# Patient Record
Sex: FEMALE | Race: BLACK OR AFRICAN AMERICAN | NOT HISPANIC OR LATINO
[De-identification: names, ages, dates, MRNs, and addresses within clinical notes are randomized per-mention and may not be internally consistent; named-entity substitution may affect disease eponyms.]

---

## 2017-05-30 PROBLEM — Z00.00 ENCOUNTER FOR PREVENTIVE HEALTH EXAMINATION: Status: ACTIVE | Noted: 2017-05-30

## 2019-08-18 ENCOUNTER — TRANSCRIPTION ENCOUNTER (OUTPATIENT)
Age: 29
End: 2019-08-18

## 2019-11-13 ENCOUNTER — INPATIENT (INPATIENT)
Facility: HOSPITAL | Age: 29
LOS: 1 days | Discharge: HOME | End: 2019-11-15
Attending: SURGERY | Admitting: SURGERY
Payer: MEDICARE

## 2019-11-13 VITALS
SYSTOLIC BLOOD PRESSURE: 138 MMHG | HEART RATE: 76 BPM | HEIGHT: 66 IN | TEMPERATURE: 98 F | WEIGHT: 188.94 LBS | OXYGEN SATURATION: 100 % | DIASTOLIC BLOOD PRESSURE: 97 MMHG | RESPIRATION RATE: 16 BRPM

## 2019-11-13 DIAGNOSIS — K81.0 ACUTE CHOLECYSTITIS: ICD-10-CM

## 2019-11-13 LAB
ALBUMIN SERPL ELPH-MCNC: 3.7 G/DL — SIGNIFICANT CHANGE UP (ref 3.3–5)
ALP SERPL-CCNC: 138 U/L — HIGH (ref 40–120)
ALT FLD-CCNC: 631 U/L — HIGH (ref 12–78)
ANION GAP SERPL CALC-SCNC: 6 MMOL/L — SIGNIFICANT CHANGE UP (ref 5–17)
APPEARANCE UR: CLEAR — SIGNIFICANT CHANGE UP
AST SERPL-CCNC: 621 U/L — HIGH (ref 15–37)
BACTERIA # UR AUTO: ABNORMAL
BASOPHILS # BLD AUTO: 0.01 K/UL — SIGNIFICANT CHANGE UP (ref 0–0.2)
BASOPHILS NFR BLD AUTO: 0.1 % — SIGNIFICANT CHANGE UP (ref 0–2)
BILIRUB SERPL-MCNC: 0.9 MG/DL — SIGNIFICANT CHANGE UP (ref 0.2–1.2)
BILIRUB UR-MCNC: NEGATIVE — SIGNIFICANT CHANGE UP
BLD GP AB SCN SERPL QL: SIGNIFICANT CHANGE UP
BUN SERPL-MCNC: 10 MG/DL — SIGNIFICANT CHANGE UP (ref 7–23)
CALCIUM SERPL-MCNC: 8.9 MG/DL — SIGNIFICANT CHANGE UP (ref 8.5–10.1)
CHLORIDE SERPL-SCNC: 106 MMOL/L — SIGNIFICANT CHANGE UP (ref 96–108)
CO2 SERPL-SCNC: 27 MMOL/L — SIGNIFICANT CHANGE UP (ref 22–31)
COLOR SPEC: YELLOW — SIGNIFICANT CHANGE UP
CREAT SERPL-MCNC: 0.96 MG/DL — SIGNIFICANT CHANGE UP (ref 0.5–1.3)
DIFF PNL FLD: NEGATIVE — SIGNIFICANT CHANGE UP
EOSINOPHIL # BLD AUTO: 0.17 K/UL — SIGNIFICANT CHANGE UP (ref 0–0.5)
EOSINOPHIL NFR BLD AUTO: 2.5 % — SIGNIFICANT CHANGE UP (ref 0–6)
EPI CELLS # UR: ABNORMAL
GLUCOSE SERPL-MCNC: 75 MG/DL — SIGNIFICANT CHANGE UP (ref 70–99)
GLUCOSE UR QL: NEGATIVE MG/DL — SIGNIFICANT CHANGE UP
HCG SERPL-ACNC: <1 MIU/ML — SIGNIFICANT CHANGE UP
HCT VFR BLD CALC: 36.9 % — SIGNIFICANT CHANGE UP (ref 34.5–45)
HGB BLD-MCNC: 11.8 G/DL — SIGNIFICANT CHANGE UP (ref 11.5–15.5)
IMM GRANULOCYTES NFR BLD AUTO: 0.3 % — SIGNIFICANT CHANGE UP (ref 0–1.5)
KETONES UR-MCNC: NEGATIVE — SIGNIFICANT CHANGE UP
LEUKOCYTE ESTERASE UR-ACNC: ABNORMAL
LIDOCAIN IGE QN: 3872 U/L — HIGH (ref 73–393)
LYMPHOCYTES # BLD AUTO: 2.1 K/UL — SIGNIFICANT CHANGE UP (ref 1–3.3)
LYMPHOCYTES # BLD AUTO: 31.3 % — SIGNIFICANT CHANGE UP (ref 13–44)
MCHC RBC-ENTMCNC: 28.6 PG — SIGNIFICANT CHANGE UP (ref 27–34)
MCHC RBC-ENTMCNC: 32 GM/DL — SIGNIFICANT CHANGE UP (ref 32–36)
MCV RBC AUTO: 89.6 FL — SIGNIFICANT CHANGE UP (ref 80–100)
MONOCYTES # BLD AUTO: 0.47 K/UL — SIGNIFICANT CHANGE UP (ref 0–0.9)
MONOCYTES NFR BLD AUTO: 7 % — SIGNIFICANT CHANGE UP (ref 2–14)
NEUTROPHILS # BLD AUTO: 3.93 K/UL — SIGNIFICANT CHANGE UP (ref 1.8–7.4)
NEUTROPHILS NFR BLD AUTO: 58.8 % — SIGNIFICANT CHANGE UP (ref 43–77)
NITRITE UR-MCNC: NEGATIVE — SIGNIFICANT CHANGE UP
NRBC # BLD: 0 /100 WBCS — SIGNIFICANT CHANGE UP (ref 0–0)
PH UR: 8 — SIGNIFICANT CHANGE UP (ref 5–8)
PLATELET # BLD AUTO: 224 K/UL — SIGNIFICANT CHANGE UP (ref 150–400)
POTASSIUM SERPL-MCNC: 4.2 MMOL/L — SIGNIFICANT CHANGE UP (ref 3.5–5.3)
POTASSIUM SERPL-SCNC: 4.2 MMOL/L — SIGNIFICANT CHANGE UP (ref 3.5–5.3)
PROT SERPL-MCNC: 7.6 GM/DL — SIGNIFICANT CHANGE UP (ref 6–8.3)
PROT UR-MCNC: NEGATIVE MG/DL — SIGNIFICANT CHANGE UP
RBC # BLD: 4.12 M/UL — SIGNIFICANT CHANGE UP (ref 3.8–5.2)
RBC # FLD: 13.5 % — SIGNIFICANT CHANGE UP (ref 10.3–14.5)
SODIUM SERPL-SCNC: 139 MMOL/L — SIGNIFICANT CHANGE UP (ref 135–145)
SP GR SPEC: 1.01 — SIGNIFICANT CHANGE UP (ref 1.01–1.02)
UROBILINOGEN FLD QL: NEGATIVE MG/DL — SIGNIFICANT CHANGE UP
WBC # BLD: 6.7 K/UL — SIGNIFICANT CHANGE UP (ref 3.8–10.5)
WBC # FLD AUTO: 6.7 K/UL — SIGNIFICANT CHANGE UP (ref 3.8–10.5)
WBC UR QL: SIGNIFICANT CHANGE UP

## 2019-11-13 PROCEDURE — 74177 CT ABD & PELVIS W/CONTRAST: CPT | Mod: 26

## 2019-11-13 PROCEDURE — 76700 US EXAM ABDOM COMPLETE: CPT | Mod: 26

## 2019-11-13 PROCEDURE — 99285 EMERGENCY DEPT VISIT HI MDM: CPT

## 2019-11-13 RX ORDER — PIPERACILLIN AND TAZOBACTAM 4; .5 G/20ML; G/20ML
3.38 INJECTION, POWDER, LYOPHILIZED, FOR SOLUTION INTRAVENOUS ONCE
Refills: 0 | Status: COMPLETED | OUTPATIENT
Start: 2019-11-13 | End: 2019-11-13

## 2019-11-13 RX ORDER — IOHEXOL 300 MG/ML
30 INJECTION, SOLUTION INTRAVENOUS ONCE
Refills: 0 | Status: COMPLETED | OUTPATIENT
Start: 2019-11-13 | End: 2019-11-13

## 2019-11-13 RX ORDER — SODIUM CHLORIDE 9 MG/ML
1000 INJECTION INTRAMUSCULAR; INTRAVENOUS; SUBCUTANEOUS ONCE
Refills: 0 | Status: COMPLETED | OUTPATIENT
Start: 2019-11-13 | End: 2019-11-13

## 2019-11-13 RX ORDER — HEPARIN SODIUM 5000 [USP'U]/ML
5000 INJECTION INTRAVENOUS; SUBCUTANEOUS EVERY 8 HOURS
Refills: 0 | Status: DISCONTINUED | OUTPATIENT
Start: 2019-11-13 | End: 2019-11-15

## 2019-11-13 RX ORDER — ONDANSETRON 8 MG/1
4 TABLET, FILM COATED ORAL ONCE
Refills: 0 | Status: COMPLETED | OUTPATIENT
Start: 2019-11-13 | End: 2019-11-13

## 2019-11-13 RX ORDER — SODIUM CHLORIDE 9 MG/ML
1000 INJECTION, SOLUTION INTRAVENOUS
Refills: 0 | Status: DISCONTINUED | OUTPATIENT
Start: 2019-11-13 | End: 2019-11-15

## 2019-11-13 RX ORDER — FERROUS SULFATE 325(65) MG
65 TABLET ORAL
Qty: 0 | Refills: 0 | DISCHARGE

## 2019-11-13 RX ORDER — MORPHINE SULFATE 50 MG/1
2 CAPSULE, EXTENDED RELEASE ORAL EVERY 4 HOURS
Refills: 0 | Status: DISCONTINUED | OUTPATIENT
Start: 2019-11-13 | End: 2019-11-15

## 2019-11-13 RX ORDER — ACETAMINOPHEN 500 MG
975 TABLET ORAL ONCE
Refills: 0 | Status: COMPLETED | OUTPATIENT
Start: 2019-11-13 | End: 2019-11-13

## 2019-11-13 RX ADMIN — ONDANSETRON 4 MILLIGRAM(S): 8 TABLET, FILM COATED ORAL at 15:44

## 2019-11-13 RX ADMIN — IOHEXOL 30 MILLILITER(S): 300 INJECTION, SOLUTION INTRAVENOUS at 15:49

## 2019-11-13 RX ADMIN — HEPARIN SODIUM 5000 UNIT(S): 5000 INJECTION INTRAVENOUS; SUBCUTANEOUS at 23:10

## 2019-11-13 RX ADMIN — SODIUM CHLORIDE 125 MILLILITER(S): 9 INJECTION, SOLUTION INTRAVENOUS at 23:10

## 2019-11-13 RX ADMIN — PIPERACILLIN AND TAZOBACTAM 200 GRAM(S): 4; .5 INJECTION, POWDER, LYOPHILIZED, FOR SOLUTION INTRAVENOUS at 18:26

## 2019-11-13 RX ADMIN — Medication 975 MILLIGRAM(S): at 15:44

## 2019-11-13 RX ADMIN — SODIUM CHLORIDE 1000 MILLILITER(S): 9 INJECTION INTRAMUSCULAR; INTRAVENOUS; SUBCUTANEOUS at 15:44

## 2019-11-13 RX ADMIN — SODIUM CHLORIDE 1000 MILLILITER(S): 9 INJECTION INTRAMUSCULAR; INTRAVENOUS; SUBCUTANEOUS at 17:30

## 2019-11-13 NOTE — H&P ADULT - NSHPLABSRESULTS_GEN_ALL_CORE
CBC             11.8   6.70  )-----------( 224      ( 2019 15:41 )             36.9   CMP    139  |  106  |  10  ----------------------------<  75  4.2   |  27  |  0.96    Ca    8.9      2019 15:41    TPro  7.6  /  Alb  3.7  /  TBili  0.9  /  DBili  x   /  AST  621<H>  /  ALT  631<H>  /  AlkPhos  138<H>      Urinalysis Basic - ( 2019 18:00 )    Color: Yellow / Appearance: Clear / S.010 / pH: x  Gluc: x / Ketone: Negative  / Bili: Negative / Urobili: Negative mg/dL   Blood: x / Protein: Negative mg/dL / Nitrite: Negative   Leuk Esterase: Small / RBC: x / WBC 3-5   Sq Epi: x / Non Sq Epi: Moderate / Bacteria: Few    US Abdomen Complete (19 @ 16:25)  IMPRESSION:   1. Borderline liver size. No hepatic pathology otherwise.  2. Definite gallbladder disease with small calcific stones, wall   thickening and positive sonographic Zepeda's sign.     CT Abdomen and Pelvis w/ IV Cont (19 @ 18:11)  Impression: No CT evidence for acute pancreatitis. Clinical correlation   with pancreatic enzymes is recommended.    Cholelithiasis with pericholecystic stranding, suggestive of acute   cholecystitis. If clinically indicated, HIDA scan may be pursued for   further evaluation.

## 2019-11-13 NOTE — ED PROVIDER NOTE - CLINICAL SUMMARY MEDICAL DECISION MAKING FREE TEXT BOX
Ddx: Biliary colic/ cholecystitis/ gastritis/ no lower abd pain to suggest appendicitis or ovarian torsion  Plan: Cbc, cmp, lipase, ua, hcg, pt only wants tylenol for pain/ US, reassess

## 2019-11-13 NOTE — H&P ADULT - HISTORY OF PRESENT ILLNESS
Pt is a 30 y/o female with PMHx of gallstones c/o abdominal pain. Pt states that the pain began 2 days ago around 3 AM. She states that the pain started as a tightness in her chest that radiated to her back. She states that the following morning she felt "completely fine" and that her abdominal pain was a 1 out of 10 in severity. Today patient states she ate pancakes and eggs for breakfast, which triggered her abdominal pain and prompted her to come to the ED. Patient states that her pain was a 9 out of 10 in severity and diffuse throughout her abdomen. She admits that her pain is most severe in her epigastric and periumbilical area. She also admits to nausea and an episode of "shakes". She states that she has never experienced anything like this before, but admits that she has a h/o gallstones. Denies fever, vomiting, diarrhea, SOB, chest pain, dizziness, dysuria, hematouria, change in bowel habits.

## 2019-11-13 NOTE — H&P ADULT - PROBLEM SELECTOR PLAN 1
- Serial abdominal exams  - MRCP in AM to r/o CBD stone  - IVFs/NPO  - Pain management  - VTE prophylaxis   - Will discuss with Dr. Nunez

## 2019-11-13 NOTE — ED PROVIDER NOTE - OBJECTIVE STATEMENT
Pt is a 30 yo lady with a pmhx of gallstones who presents to the ED with abdominal pain. It started 2 nights ago. Is on the RUQ, goes to the back. Has had nausea. Is worse after eating. No dysuria, no fevers. LMP 10/30. No hx of abd surgeries.

## 2019-11-13 NOTE — H&P ADULT - NSHPPHYSICALEXAM_GEN_ALL_CORE
Constitutional: WDWN resting comfortably in bed; NAD  HEENT: NC/AT, PERRL, EOMI, anicteric sclera, no nasal discharge; uvula midline, no oropharyngeal erythema or exudates  Neck: supple; no JVD or thyromegaly  Respiratory: CTA B/L; no W/R/R, no retractions  Cardiac: +S1/S2; RRR; no M/R/G; PMI non-displaced  Gastrointestinal: Obese, (+) BS, soft, non-distended. Tender to palpation in the epigastric and periumbilical area. (-) Zepeda's sign. No guarding, no rebound tenderness  Extremities: , no clubbing or cyanosis; no peripheral edema  Musculoskeletal:  no joint swelling, tenderness or erythema  Vascular: 2+ radial, femoral, DP/PT pulses B/L  Skin: warm, dry and intact; no rashes, wounds, or scars  Neurologic: AAOx3; CNS grossly intact; no focal deficits

## 2019-11-13 NOTE — H&P ADULT - ASSESSMENT
30 y/o female with PMHx of gallstones admitted for abdominal pain, CT suggestive of acute cholecystitis. Elevated lipase and liver enzymes

## 2019-11-14 DIAGNOSIS — K85.10 BILIARY ACUTE PANCREATITIS WITHOUT NECROSIS OR INFECTION: ICD-10-CM

## 2019-11-14 DIAGNOSIS — K81.9 CHOLECYSTITIS, UNSPECIFIED: ICD-10-CM

## 2019-11-14 LAB
ALBUMIN SERPL ELPH-MCNC: 3.4 G/DL — SIGNIFICANT CHANGE UP (ref 3.3–5)
ALP SERPL-CCNC: 179 U/L — HIGH (ref 40–120)
ALT FLD-CCNC: 904 U/L — HIGH (ref 12–78)
AMYLASE P1 CFR SERPL: 120 U/L — HIGH (ref 25–115)
ANION GAP SERPL CALC-SCNC: 6 MMOL/L — SIGNIFICANT CHANGE UP (ref 5–17)
AST SERPL-CCNC: 544 U/L — HIGH (ref 15–37)
BILIRUB DIRECT SERPL-MCNC: 1.25 MG/DL — HIGH (ref 0.05–0.2)
BILIRUB INDIRECT FLD-MCNC: 0.8 MG/DL — SIGNIFICANT CHANGE UP (ref 0.2–1)
BILIRUB SERPL-MCNC: 2 MG/DL — HIGH (ref 0.2–1.2)
BUN SERPL-MCNC: 6 MG/DL — LOW (ref 7–23)
CALCIUM SERPL-MCNC: 8.9 MG/DL — SIGNIFICANT CHANGE UP (ref 8.5–10.1)
CHLORIDE SERPL-SCNC: 111 MMOL/L — HIGH (ref 96–108)
CO2 SERPL-SCNC: 26 MMOL/L — SIGNIFICANT CHANGE UP (ref 22–31)
CREAT SERPL-MCNC: 0.95 MG/DL — SIGNIFICANT CHANGE UP (ref 0.5–1.3)
CULTURE RESULTS: SIGNIFICANT CHANGE UP
GLUCOSE SERPL-MCNC: 73 MG/DL — SIGNIFICANT CHANGE UP (ref 70–99)
HCT VFR BLD CALC: 36.3 % — SIGNIFICANT CHANGE UP (ref 34.5–45)
HGB BLD-MCNC: 11.5 G/DL — SIGNIFICANT CHANGE UP (ref 11.5–15.5)
LIDOCAIN IGE QN: 552 U/L — HIGH (ref 73–393)
MCHC RBC-ENTMCNC: 28.7 PG — SIGNIFICANT CHANGE UP (ref 27–34)
MCHC RBC-ENTMCNC: 31.7 GM/DL — LOW (ref 32–36)
MCV RBC AUTO: 90.5 FL — SIGNIFICANT CHANGE UP (ref 80–100)
NRBC # BLD: 0 /100 WBCS — SIGNIFICANT CHANGE UP (ref 0–0)
PLATELET # BLD AUTO: 232 K/UL — SIGNIFICANT CHANGE UP (ref 150–400)
POTASSIUM SERPL-MCNC: 3.8 MMOL/L — SIGNIFICANT CHANGE UP (ref 3.5–5.3)
POTASSIUM SERPL-SCNC: 3.8 MMOL/L — SIGNIFICANT CHANGE UP (ref 3.5–5.3)
PROT SERPL-MCNC: 7.1 GM/DL — SIGNIFICANT CHANGE UP (ref 6–8.3)
RBC # BLD: 4.01 M/UL — SIGNIFICANT CHANGE UP (ref 3.8–5.2)
RBC # FLD: 13.5 % — SIGNIFICANT CHANGE UP (ref 10.3–14.5)
SODIUM SERPL-SCNC: 143 MMOL/L — SIGNIFICANT CHANGE UP (ref 135–145)
SPECIMEN SOURCE: SIGNIFICANT CHANGE UP
WBC # BLD: 4.45 K/UL — SIGNIFICANT CHANGE UP (ref 3.8–10.5)
WBC # FLD AUTO: 4.45 K/UL — SIGNIFICANT CHANGE UP (ref 3.8–10.5)

## 2019-11-14 PROCEDURE — 74181 MRI ABDOMEN W/O CONTRAST: CPT | Mod: 26

## 2019-11-14 PROCEDURE — 99232 SBSQ HOSP IP/OBS MODERATE 35: CPT

## 2019-11-14 RX ADMIN — HEPARIN SODIUM 5000 UNIT(S): 5000 INJECTION INTRAVENOUS; SUBCUTANEOUS at 05:38

## 2019-11-14 RX ADMIN — HEPARIN SODIUM 5000 UNIT(S): 5000 INJECTION INTRAVENOUS; SUBCUTANEOUS at 13:28

## 2019-11-14 RX ADMIN — SODIUM CHLORIDE 125 MILLILITER(S): 9 INJECTION, SOLUTION INTRAVENOUS at 07:03

## 2019-11-14 RX ADMIN — SODIUM CHLORIDE 125 MILLILITER(S): 9 INJECTION, SOLUTION INTRAVENOUS at 23:40

## 2019-11-14 RX ADMIN — HEPARIN SODIUM 5000 UNIT(S): 5000 INJECTION INTRAVENOUS; SUBCUTANEOUS at 23:38

## 2019-11-14 NOTE — CONSULT NOTE ADULT - PROBLEM SELECTOR RECOMMENDATION 9
-IV hydration   -Slowly advance diet as tolerated; early enteral feeding advised if possible   -Pain control prn   -Trend LFTs daily -IV hydration   -NPO advance diet as tolerated   -Pain control prn   -Trend LFTs daily

## 2019-11-14 NOTE — PROGRESS NOTE ADULT - SUBJECTIVE AND OBJECTIVE BOX
JONAS REGAN  MRN-[id number]    GENERAL SURGERY/ DR. OROZCO    NO FEVER, CHILLS, T MAX NOTED  + FLATUS,  NO BM  VOIDING AND AMBULATING   DENIES ANY ABDOMINAL PAIN     Vital Signs Last 24 Hrs  T(C): 36.8 (14 Nov 2019 05:22), Max: 37.7 (13 Nov 2019 19:13)  T(F): 98.2 (14 Nov 2019 05:22), Max: 99.8 (13 Nov 2019 19:13)  HR: 62 (14 Nov 2019 05:22) (57 - 82)  BP: 110/69 (14 Nov 2019 05:22) (110/69 - 138/97)  BP(mean): --  RR: 18 (14 Nov 2019 05:22) (16 - 18)  SpO2: 100% (14 Nov 2019 05:22) (98% - 100%)      11-13-19 @ 07:01  -  11-14-19 @ 07:00  --------------------------------------------------------  IN: 1125 mL / OUT: 0 mL / NET: 1125 mL     LUNGS: CLEAR TO AUSCULTATION , NO W/R/R  ABDOMEN: +BS,SOFT, NON DISTENDED, SOME TENDERNESS RUQ WITHOUT ANY GUARDING/  RIGIDITY. NEGATIVE RATLIFF'S SIGN      EXTREMITY: NO EDEMA, NO CALF TENDERNESS                             11.5   4.45  )-----------( 232      ( 14 Nov 2019 08:49 )             36.3      11-14    143  |  111<H>  |  6<L>  ----------------------------<  73  3.8   |  26  |  0.95    Ca    8.9      14 Nov 2019 08:49    TPro  7.1  /  Alb  3.4  /  TBili  2.0<H>  /  DBili  1.25<H>  /  AST  544<H>  /  ALT  904<H>  /  AlkPhos  179<H>  11-14    Amylase, Serum Total in AM (11.14.19 @ 08:49)    Amylase, Serum Total: 120 U/L    Lipase, Serum in AM (11.14.19 @ 08:49)    Lipase, Serum: 552 U/L    Lipase, Serum (11.13.19 @ 15:41)    Lipase, Serum: 3872 U/L        CT Abdomen and Pelvis w/ IV Cont (11.13.19 @ 18:11)     INTERPRETATION:  CT of the abdomen and pelvis with IV contrast    Clinical Indication: Abdominal pain    Technique: Axial multidetector CT images of theabdomen and pelvis are   acquired following the administration of oral and IV contrast (90 cc   Omnipaque-350 administered, 10 cc discarded).    Comparison: None.    Findings: Limited sections through the lung bases appear unremarkable.    Gallstonesin the gallbladder. Mild pericholecystic stranding. Findings   are suggestive of acute cholecystitis. If clinically indicated, HIDA scan   may be pursued for further evaluation.    Tiny hypodense lesion in the caudate lobe of the liver, too small to   characterize. Small hypodense area in the left hepatic lobe adjacent to   falciform ligament, likely due to focal fatty infiltration.    The common bile duct maintains normal caliber. The pancreas maintains   normal morphology and enhancement without peripancreatic stranding or   fluid.    The spleen, adrenals and kidneys appear unremarkable.    Small fat-containing periapical hernia.    The appendix appears normal. No bowel obstruction, or grossly thickened   bowel wall.    No evidence for freeair, ascites, or enlarged lymph node.    The urinary bladder and the uterus appear unremarkable.     Impression: No CT evidence for acute pancreatitis. Clinical correlation   with pancreatic enzymes is recommended.    Cholelithiasis with pericholecystic stranding, suggestive of acute   cholecystitis. If clinically indicated, HIDA scan may be pursued for   further evaluation.    KYREE OLSON M.D., ATTENDING RADIOLOGIST  This document has been electronically signed. Nov 13 2019  6:30PM                            ASSESSMENT &  PLAN:    CHOLECYSTITIS, CHOLELITHIASIS   GALLSTONE PANCREATITIS LIPASE DOWN   ELEVATED AND RISING BILI AND LFTs  R/O CHOLEDOCHOLITHIASIS     NPO  HYDRATION  PAIN MANAGEMENT  MRCP  PENDING MRCP MAY NEED GI/ ERCP  WILL FOLLOW UP

## 2019-11-14 NOTE — CHART NOTE - NSCHARTNOTEFT_GEN_A_CORE
Pt seen with Dr. Nunez.  Pt examined in bed.  She said she feels better now. No c/o N/V, pain    < from: MR MRCP No Cont (11.14.19 @ 10:05) >      EXAM:  MR MRCP                            PROCEDURE DATE:  11/14/2019          INTERPRETATION:  MRCP without IV contrast    Indication: Gallstone pancreatitis.    Technique: Utilizing a 1.5 Rafia high-field magnet, multiplanar   multisequence MR images of the abdomen are acquired without IV contrast,   supplemented by thin and thick slab MRCP images.    Comparison: No prior abdominal MR is available for comparison. Reference   is made with previous abdominal/pelvic CT dated 11/13/2019 and abdominal   ultrasound dated 11/13/2019.    Findings: Multiple gallstones in the gallbladder. Mild pericholecystic   edema is noted. Findings may represent acute cholecystitis. If clinically   indicated, HIDA scan may be pursued for further evaluation. Thecommon   bile duct maintains normal caliber at 4 mm in diameter. No suspicious   filling defect in the common bile duct to suggest choledocholithiasis.   The main pancreatic duct maintains normal caliber.    Allowing for the noncontrast technique, the liver, spleen, adrenals and   kidneys appear grossly unremarkable.    The pancreas maintains normal morphology and signal without   peripancreatic edema or fluid.     No evidence for ascites, or enlarged lymph node. The visualized bowel   structuresappear grossly unremarkable.     Small fat-containing periapical hernia.    Impression:    Multiple gallstones in the gallbladder. Mild pericholecystic edema is   noted. Findings may represent acute cholecystitis. If clinically   indicated, HIDA scanmay be pursued for further evaluation.    No MR evidence for choledocholithiasis.    No MR evidence for acute pancreatitis. Clinical correlation with   pancreatic enzymes is recommended.    KYREE OLSON M.D., ATTENDING RADIOLOGIST  This document has been electronically signed. Nov 14 2019 10:27AM      ID consult noted    Impression:  gall stone pancreatitis    Plan:  Dr Nunez spoke with the pt. He has suggested that she needs a cholecystectomy. He left the timing up to her [on this admission, vs. doing on an elective outpt basis] The pt will discuss with her  tonight. Will place pt NPO after MN today in case she wants the surgery tomorrow. Will start on clear liquid diet now.    Repeat labs in AM.

## 2019-11-14 NOTE — CONSULT NOTE ADULT - SUBJECTIVE AND OBJECTIVE BOX
Chief Complaint:  Patient is a 29y old  Female who presents with a chief complaint of Abdominal pain (2019 09:49)      HPI:  Pt is a 28 y/o female with PMHx of gallstones c/o abdominal pain. Pt states that the pain began 2 days ago around 3 AM. She states that the pain started as a tightness in her chest that radiated to her back. She states that the following morning she felt "completely fine" and that her abdominal pain was a 1 out of 10 in severity. Today patient states she ate pancakes and eggs for breakfast, which triggered her abdominal pain and prompted her to come to the ED. Patient states that her pain was a 9 out of 10 in severity and diffuse throughout her abdomen. She admits that her pain is most severe in her epigastric and periumbilical area. She also admits to nausea and an episode of "shakes". She states that she has never experienced anything like this before, but admits that she has a h/o gallstones. Denies fever, vomiting, diarrhea, SOB, chest pain, dizziness, dysuria, hematouria, change in bowel habits. (2019 20:32). GI consulted for the above. She denies any recent travel, new medications, sick contacts, history of trauma, significant ETOH abuse.       PMH/PSH:PAST MEDICAL & SURGICAL HISTORY:  Gallstones  No significant past surgical history      Allergies:  No Known Allergies      Medications:  heparin  Injectable 5000 Unit(s) SubCutaneous every 8 hours  lactated ringers. 1000 milliLiter(s) IV Continuous <Continuous>  morphine  - Injectable 2 milliGRAM(s) IV Push every 4 hours PRN      Review of Systems:    General:  No weight loss, fevers, chills, night sweats, fatigue,   Eyes:  Good vision, no reported pain  ENT:  No sore throat, pain, runny nose, dysphagia  CV:  No pain, palpitations, hypo/hypertension  Resp:  No dyspnea, cough, tachypnea, wheezing  GI:  as per HPI   :  No pain, bleeding, incontinence, nocturia  Muscle:  No pain, weakness  Breast:  No pain, abscess, mass, discharge  Neuro:  No weakness, tingling, memory problems  Psych:  No fatigue, insomnia, mood problems, depression  Endocrine:  No polyuria, polydipsia, cold/heat intolerance  Heme:  No petechiae, ecchymosis, easy bruisability  Skin:  No rash, tattoos, scars, edema    Relevant Family History:   FAMILY HISTORY:  No pertinent family history in first degree relatives      Relevant Social History: Alcohol ( -) , Tobacco ( -) , Illicit drugs (- )     Physical Exam:    Vital Signs:  Vital Signs Last 24 Hrs  T(C): 36.4 (2019 11:19), Max: 37.7 (2019 19:13)  T(F): 97.6 (2019 11:19), Max: 99.8 (2019 19:13)  HR: 61 (2019 11:19) (57 - 82)  BP: 131/85 (2019 11:19) (110/69 - 138/97)  BP(mean): --  RR: 18 (2019 11:19) (16 - 18)  SpO2: 99% (2019 11:19) (98% - 100%)  Daily Height in cm: 167.64 (2019 22:36)    Daily Weight in k.6 (2019 05:22)    General:  Appears stated age, well-groomed, well-nourished, no distress  HEENT:  NC/AT,  conjunctivae clear and pink, no thyromegaly, nodules, adenopathy, no JVD, anicteric sclera  Chest:  Full & symmetric excursion, no increased effort, breath sounds clear  Cardiovascular:  Regular rhythm, S1, S2, no murmur/rub/S3/S4, no abdominal bruit, no edema  Abdomen:  Soft, moderately ttp diffusely, slightly distended, no rebound/guarding, normoactive bowel sounds,  no masses , no hepatosplenomegaly, no signs of chronic liver disease  Extremities:  no cyanosis, clubbing or edema  Skin:  No rash/erythema/ecchymoses/petechiae/wounds/abscess/warm/dry  Neuro/Psych:  Alert, oriented, no asterixis, no tremor, no encephalopathy    Laboratory:                          11.5   4.45  )-----------( 232      ( 2019 08:49 )             36.3         143  |  111<H>  |  6<L>  ----------------------------<  73  3.8   |  26  |  0.95    Ca    8.9      2019 08:49    TPro  7.1  /  Alb  3.4  /  TBili  2.0<H>  /  DBili  1.25<H>  /  AST  544<H>  /  ALT  904<H>  /  AlkPhos  179<H>  11-14    LIVER FUNCTIONS - ( 2019 08:49 )  Alb: 3.4 g/dL / Pro: 7.1 gm/dL / ALK PHOS: 179 U/L / ALT: 904 U/L / AST: 544 U/L / GGT: x             Urinalysis Basic - ( 2019 18:00 )    Color: Yellow / Appearance: Clear / S.010 / pH: x  Gluc: x / Ketone: Negative  / Bili: Negative / Urobili: Negative mg/dL   Blood: x / Protein: Negative mg/dL / Nitrite: Negative   Leuk Esterase: Small / RBC: x / WBC 3-5   Sq Epi: x / Non Sq Epi: Moderate / Bacteria: Few      Amylase [id number] U/L<H>      Lipase [id number] U/L<H>       Ammonia--  Amylase Serum--      Lipase [id number] U/L<H>       Ammonia--      Intake and Output    19 @ 07:01  -  19 @ 07:00  --------------------------------------------------------  IN: 1125 mL / OUT: 0 mL / NET: 1125 mL        Imaging:  < from: CT Abdomen and Pelvis w/ IV Cont (19 @ 18:11) >    EXAM:  CT ABDOMEN AND PELVIS IC                            PROCEDURE DATE:  2019          INTERPRETATION:  CT of the abdomen and pelvis with IV contrast    Clinical Indication: Abdominal pain    Technique: Axial multidetector CT images of theabdomen and pelvis are   acquired following the administration of oral and IV contrast (90 cc   Omnipaque-350 administered, 10 cc discarded).    Comparison: None.    Findings: Limited sections through the lung bases appear unremarkable.    Gallstonesin the gallbladder. Mild pericholecystic stranding. Findings   are suggestive of acute cholecystitis. If clinically indicated, HIDA scan   may be pursued for further evaluation.    Tiny hypodense lesion in the caudate lobe of the liver, too small to   characterize. Small hypodense area in the left hepatic lobe adjacent to   falciform ligament, likely due to focal fatty infiltration.    The common bile duct maintains normal caliber. The pancreas maintains   normal morphology and enhancement without peripancreatic stranding or   fluid.    The spleen, adrenals and kidneys appear unremarkable.    Small fat-containing periapical hernia.    The appendix appears normal. No bowel obstruction, or grossly thickened   bowel wall.    No evidence for freeair, ascites, or enlarged lymph node.    The urinary bladder and the uterus appear unremarkable.     Impression: No CT evidence for acute pancreatitis. Clinical correlation   with pancreatic enzymes is recommended.    Cholelithiasis with pericholecystic stranding, suggestive of acute   cholecystitis. If clinically indicated, HIDA scan may be pursued for   further evaluation      KYREE OLSON M.D., ATTENDING RADIOLOGIST  This document has been electronically signed. 2019  6:30PM        < from: MR MRCP No Cont (11.14.19 @ 10:05) >    EXAM:  MR MRCP                            PROCEDURE DATE:  2019          INTERPRETATION:  MRCP without IV contrast    Indication: Gallstone pancreatitis.    Technique: Utilizing a 1.5 Rafia high-field magnet, multiplanar   multisequence MR images of the abdomen are acquired without IV contrast,   supplemented by thin and thick slab MRCP images.    Comparison: No prior abdominal MR is available for comparison. Reference   is made with previous abdominal/pelvic CT dated 2019 and abdominal   ultrasound dated 2019.    Findings: Multiple gallstones in the gallbladder. Mild pericholecystic   edema is noted. Findings may represent acute cholecystitis. If clinically   indicated, HIDA scan may be pursued for further evaluation. Thecommon   bile duct maintains normal caliber at 4 mm in diameter. No suspicious   filling defect in the common bile duct to suggest choledocholithiasis.   The main pancreatic duct maintains normal caliber.    Allowing for the noncontrast technique, the liver, spleen, adrenals and   kidneys appear grossly unremarkable.    The pancreas maintains normal morphology and signal without   peripancreatic edema or fluid.     No evidence for ascites, or enlarged lymph node. The visualized bowel   structuresappear grossly unremarkable.     Small fat-containing periapical hernia.    Impression:    Multiple gallstones in the gallbladder. Mild pericholecystic edema is   noted. Findings may represent acute cholecystitis. If clinically   indicated, HIDA scanmay be pursued for further evaluation.    No MR evidence for choledocholithiasis.    No MR evidence for acute pancreatitis. Clinical correlation with   pancreatic enzymes is recommended.      KYREE OLSON M.D., ATTENDING RADIOLOGIST  This document has been electronically signed. 2019 10:27AM Chief Complaint:  Patient is a 29y old  Female who presents with a chief complaint of Abdominal pain (2019 09:49)      HPI:  Pt is a 30 y/o female with PMHx of gallstones c/o abdominal pain. Pt states that the pain began 2 days ago around 3 AM. She states that the pain started as a tightness in her chest that radiated to her back. She states that the following morning she felt "completely fine" and that her abdominal pain was a 1 out of 10 in severity. Today patient states she ate pancakes and eggs for breakfast, which triggered her abdominal pain and prompted her to come to the ED. Patient states that her pain was a 9 out of 10 in severity and diffuse throughout her abdomen. She admits that her pain is most severe in her epigastric and periumbilical area. She also admits to nausea and an episode of "shakes". She states that she has never experienced anything like this before, but admits that she has a h/o gallstones. Denies fever, vomiting, diarrhea, SOB, chest pain, dizziness, dysuria, hematouria, change in bowel habits. (2019 20:32). GI consulted for the above. She denies any recent travel, new medications, sick contacts, history of trauma, significant ETOH abuse. She currently denies any significant abdominal discomfort, nausea/vomiting, fevers, chill. She remains NPO.       PMH/PSH:PAST MEDICAL & SURGICAL HISTORY:  Gallstones  No significant past surgical history      Allergies:  No Known Allergies      Medications:  heparin  Injectable 5000 Unit(s) SubCutaneous every 8 hours  lactated ringers. 1000 milliLiter(s) IV Continuous <Continuous>  morphine  - Injectable 2 milliGRAM(s) IV Push every 4 hours PRN      Review of Systems:    General:  No weight loss, fevers, chills, night sweats, fatigue,   Eyes:  Good vision, no reported pain  ENT:  No sore throat, pain, runny nose, dysphagia  CV:  No pain, palpitations, hypo/hypertension  Resp:  No dyspnea, cough, tachypnea, wheezing  GI:  as per HPI   :  No pain, bleeding, incontinence, nocturia  Muscle:  No pain, weakness  Breast:  No pain, abscess, mass, discharge  Neuro:  No weakness, tingling, memory problems  Psych:  No fatigue, insomnia, mood problems, depression  Endocrine:  No polyuria, polydipsia, cold/heat intolerance  Heme:  No petechiae, ecchymosis, easy bruisability  Skin:  No rash, tattoos, scars, edema    Relevant Family History:   FAMILY HISTORY:  No pertinent family history in first degree relatives      Relevant Social History: Alcohol ( -) , Tobacco ( -) , Illicit drugs (- )     Physical Exam:    Vital Signs:  Vital Signs Last 24 Hrs  T(C): 36.4 (2019 11:19), Max: 37.7 (2019 19:13)  T(F): 97.6 (2019 11:19), Max: 99.8 (2019 19:13)  HR: 61 (2019 11:19) (57 - 82)  BP: 131/85 (2019 11:19) (110/69 - 138/97)  BP(mean): --  RR: 18 (2019 11:19) (16 - 18)  SpO2: 99% (:) (98% - 100%)  Daily Height in cm: 167.64 (2019 22:36)    Daily Weight in k.6 (2019 05:22)    General:  Appears stated age, well-groomed, well-nourished, no distress  HEENT:  NC/AT,  conjunctivae clear and pink, no thyromegaly, nodules, adenopathy, no JVD, anicteric sclera  Chest:  Full & symmetric excursion, no increased effort, breath sounds clear  Cardiovascular:  Regular rhythm, S1, S2, no murmur/rub/S3/S4, no abdominal bruit, no edema  Abdomen:  Soft, moderately ttp diffusely, slightly distended, no rebound/guarding, normoactive bowel sounds, negative murphys  Extremities:  no cyanosis, clubbing or edema  Skin:  No rash/erythema/ecchymoses/petechiae/wounds/abscess/warm/dry  Neuro/Psych:  Alert, oriented, no asterixis, no tremor, no encephalopathy    Laboratory:                          11.5   4.45  )-----------( 232      ( 2019 08:49 )             36.3     11-14    143  |  111<H>  |  6<L>  ----------------------------<  73  3.8   |  26  |  0.95    Ca    8.9      2019 08:49    TPro  7.1  /  Alb  3.4  /  TBili  2.0<H>  /  DBili  1.25<H>  /  AST  544<H>  /  ALT  904<H>  /  AlkPhos  179<H>  11-14    LIVER FUNCTIONS - ( 2019 08:49 )  Alb: 3.4 g/dL / Pro: 7.1 gm/dL / ALK PHOS: 179 U/L / ALT: 904 U/L / AST: 544 U/L / GGT: x             Urinalysis Basic - ( 2019 18:00 )    Color: Yellow / Appearance: Clear / S.010 / pH: x  Gluc: x / Ketone: Negative  / Bili: Negative / Urobili: Negative mg/dL   Blood: x / Protein: Negative mg/dL / Nitrite: Negative   Leuk Esterase: Small / RBC: x / WBC 3-5   Sq Epi: x / Non Sq Epi: Moderate / Bacteria: Few      Amylase [id number] U/L<H>      Lipase [id number] U/L<H>       Ammonia--  Amylase Serum--      Lipase [id number] U/L<H>       Ammonia--      Intake and Output    19 @ 07:01  -  19 @ 07:00  --------------------------------------------------------  IN: 1125 mL / OUT: 0 mL / NET: 1125 mL        Imaging:  < from: CT Abdomen and Pelvis w/ IV Cont (19 @ 18:11) >    EXAM:  CT ABDOMEN AND PELVIS IC                            PROCEDURE DATE:  2019          INTERPRETATION:  CT of the abdomen and pelvis with IV contrast    Clinical Indication: Abdominal pain    Technique: Axial multidetector CT images of theabdomen and pelvis are   acquired following the administration of oral and IV contrast (90 cc   Omnipaque-350 administered, 10 cc discarded).    Comparison: None.    Findings: Limited sections through the lung bases appear unremarkable.    Gallstonesin the gallbladder. Mild pericholecystic stranding. Findings   are suggestive of acute cholecystitis. If clinically indicated, HIDA scan   may be pursued for further evaluation.    Tiny hypodense lesion in the caudate lobe of the liver, too small to   characterize. Small hypodense area in the left hepatic lobe adjacent to   falciform ligament, likely due to focal fatty infiltration.    The common bile duct maintains normal caliber. The pancreas maintains   normal morphology and enhancement without peripancreatic stranding or   fluid.    The spleen, adrenals and kidneys appear unremarkable.    Small fat-containing periapical hernia.    The appendix appears normal. No bowel obstruction, or grossly thickened   bowel wall.    No evidence for freeair, ascites, or enlarged lymph node.    The urinary bladder and the uterus appear unremarkable.     Impression: No CT evidence for acute pancreatitis. Clinical correlation   with pancreatic enzymes is recommended.    Cholelithiasis with pericholecystic stranding, suggestive of acute   cholecystitis. If clinically indicated, HIDA scan may be pursued for   further evaluation      KYREE OLSON M.D., ATTENDING RADIOLOGIST  This document has been electronically signed. 2019  6:30PM        < from: MR MRCP No Cont (11.14.19 @ 10:05) >    EXAM:  MR MRCP                            PROCEDURE DATE:  2019          INTERPRETATION:  MRCP without IV contrast    Indication: Gallstone pancreatitis.    Technique: Utilizing a 1.5 Rafia high-field magnet, multiplanar   multisequence MR images of the abdomen are acquired without IV contrast,   supplemented by thin and thick slab MRCP images.    Comparison: No prior abdominal MR is available for comparison. Reference   is made with previous abdominal/pelvic CT dated 2019 and abdominal   ultrasound dated 2019.    Findings: Multiple gallstones in the gallbladder. Mild pericholecystic   edema is noted. Findings may represent acute cholecystitis. If clinically   indicated, HIDA scan may be pursued for further evaluation. Thecommon   bile duct maintains normal caliber at 4 mm in diameter. No suspicious   filling defect in the common bile duct to suggest choledocholithiasis.   The main pancreatic duct maintains normal caliber.    Allowing for the noncontrast technique, the liver, spleen, adrenals and   kidneys appear grossly unremarkable.    The pancreas maintains normal morphology and signal without   peripancreatic edema or fluid.     No evidence for ascites, or enlarged lymph node. The visualized bowel   structuresappear grossly unremarkable.     Small fat-containing periapical hernia.    Impression:    Multiple gallstones in the gallbladder. Mild pericholecystic edema is   noted. Findings may represent acute cholecystitis. If clinically   indicated, HIDA scanmay be pursued for further evaluation.    No MR evidence for choledocholithiasis.    No MR evidence for acute pancreatitis. Clinical correlation with   pancreatic enzymes is recommended.      KYREE OLSON M.D., ATTENDING RADIOLOGIST  This document has been electronically signed. 2019 10:27AM

## 2019-11-14 NOTE — CONSULT NOTE ADULT - ASSESSMENT
30 y/o female with PMHx of gallstones who reports intermittent generalized abdominal pain, radiating to her back after eating.     VSS on presentation without fevers.   Labs on presentation noted normal bili, however repeat jim 2.0 (d 1.25), ast 544, alt 904, alk phos 179, lipase ~3800  CT: no evidence of acute pancreatitis, cholelithiasis and pericholecystic fluid suggestive of cholecystitis   MRCP: no evidence of choledocholithiasis     Clinical picture is consistent with gallstone pancreatitis and cholecystitis without evidence of choledocholithiasis. Recommend conservative management with acute cholecystitis. Agree with surgical management

## 2019-11-15 ENCOUNTER — INPATIENT (INPATIENT)
Facility: HOSPITAL | Age: 29
LOS: 4 days | Discharge: HOME | End: 2019-11-20
Attending: SURGERY | Admitting: SURGERY
Payer: MEDICARE

## 2019-11-15 ENCOUNTER — TRANSCRIPTION ENCOUNTER (OUTPATIENT)
Age: 29
End: 2019-11-15

## 2019-11-15 VITALS
RESPIRATION RATE: 18 BRPM | OXYGEN SATURATION: 100 % | TEMPERATURE: 98 F | SYSTOLIC BLOOD PRESSURE: 122 MMHG | DIASTOLIC BLOOD PRESSURE: 73 MMHG | HEART RATE: 64 BPM

## 2019-11-15 VITALS
OXYGEN SATURATION: 99 % | DIASTOLIC BLOOD PRESSURE: 90 MMHG | HEART RATE: 74 BPM | TEMPERATURE: 99 F | WEIGHT: 188.94 LBS | RESPIRATION RATE: 18 BRPM | SYSTOLIC BLOOD PRESSURE: 129 MMHG | HEIGHT: 66 IN

## 2019-11-15 LAB
ALBUMIN SERPL ELPH-MCNC: 3.2 G/DL — LOW (ref 3.3–5)
ALBUMIN SERPL ELPH-MCNC: 3.8 G/DL — SIGNIFICANT CHANGE UP (ref 3.3–5)
ALP SERPL-CCNC: 172 U/L — HIGH (ref 40–120)
ALP SERPL-CCNC: 195 U/L — HIGH (ref 40–120)
ALT FLD-CCNC: 593 U/L — HIGH (ref 12–78)
ALT FLD-CCNC: 626 U/L — HIGH (ref 12–78)
AMYLASE P1 CFR SERPL: 76 U/L — SIGNIFICANT CHANGE UP (ref 25–115)
ANION GAP SERPL CALC-SCNC: 5 MMOL/L — SIGNIFICANT CHANGE UP (ref 5–17)
ANION GAP SERPL CALC-SCNC: 6 MMOL/L — SIGNIFICANT CHANGE UP (ref 5–17)
AST SERPL-CCNC: 138 U/L — HIGH (ref 15–37)
AST SERPL-CCNC: 196 U/L — HIGH (ref 15–37)
BASOPHILS # BLD AUTO: 0.01 K/UL — SIGNIFICANT CHANGE UP (ref 0–0.2)
BASOPHILS # BLD AUTO: 0.01 K/UL — SIGNIFICANT CHANGE UP (ref 0–0.2)
BASOPHILS NFR BLD AUTO: 0.1 % — SIGNIFICANT CHANGE UP (ref 0–2)
BASOPHILS NFR BLD AUTO: 0.2 % — SIGNIFICANT CHANGE UP (ref 0–2)
BILIRUB DIRECT SERPL-MCNC: 0.22 MG/DL — HIGH (ref 0.05–0.2)
BILIRUB INDIRECT FLD-MCNC: 0.4 MG/DL — SIGNIFICANT CHANGE UP (ref 0.2–1)
BILIRUB SERPL-MCNC: 0.4 MG/DL — SIGNIFICANT CHANGE UP (ref 0.2–1.2)
BILIRUB SERPL-MCNC: 0.6 MG/DL — SIGNIFICANT CHANGE UP (ref 0.2–1.2)
BUN SERPL-MCNC: 10 MG/DL — SIGNIFICANT CHANGE UP (ref 7–23)
BUN SERPL-MCNC: 5 MG/DL — LOW (ref 7–23)
CALCIUM SERPL-MCNC: 9.1 MG/DL — SIGNIFICANT CHANGE UP (ref 8.5–10.1)
CALCIUM SERPL-MCNC: 9.4 MG/DL — SIGNIFICANT CHANGE UP (ref 8.5–10.1)
CHLORIDE SERPL-SCNC: 105 MMOL/L — SIGNIFICANT CHANGE UP (ref 96–108)
CHLORIDE SERPL-SCNC: 108 MMOL/L — SIGNIFICANT CHANGE UP (ref 96–108)
CO2 SERPL-SCNC: 26 MMOL/L — SIGNIFICANT CHANGE UP (ref 22–31)
CO2 SERPL-SCNC: 27 MMOL/L — SIGNIFICANT CHANGE UP (ref 22–31)
CREAT SERPL-MCNC: 0.89 MG/DL — SIGNIFICANT CHANGE UP (ref 0.5–1.3)
CREAT SERPL-MCNC: 0.97 MG/DL — SIGNIFICANT CHANGE UP (ref 0.5–1.3)
EOSINOPHIL # BLD AUTO: 0.05 K/UL — SIGNIFICANT CHANGE UP (ref 0–0.5)
EOSINOPHIL # BLD AUTO: 0.29 K/UL — SIGNIFICANT CHANGE UP (ref 0–0.5)
EOSINOPHIL NFR BLD AUTO: 0.6 % — SIGNIFICANT CHANGE UP (ref 0–6)
EOSINOPHIL NFR BLD AUTO: 4.8 % — SIGNIFICANT CHANGE UP (ref 0–6)
GLUCOSE SERPL-MCNC: 105 MG/DL — HIGH (ref 70–99)
GLUCOSE SERPL-MCNC: 77 MG/DL — SIGNIFICANT CHANGE UP (ref 70–99)
HCT VFR BLD CALC: 34.9 % — SIGNIFICANT CHANGE UP (ref 34.5–45)
HCT VFR BLD CALC: 39.1 % — SIGNIFICANT CHANGE UP (ref 34.5–45)
HGB BLD-MCNC: 11.2 G/DL — LOW (ref 11.5–15.5)
HGB BLD-MCNC: 12.6 G/DL — SIGNIFICANT CHANGE UP (ref 11.5–15.5)
IMM GRANULOCYTES NFR BLD AUTO: 0.2 % — SIGNIFICANT CHANGE UP (ref 0–1.5)
IMM GRANULOCYTES NFR BLD AUTO: 0.3 % — SIGNIFICANT CHANGE UP (ref 0–1.5)
LIDOCAIN IGE QN: 137 U/L — SIGNIFICANT CHANGE UP (ref 73–393)
LIDOCAIN IGE QN: 181 U/L — SIGNIFICANT CHANGE UP (ref 73–393)
LYMPHOCYTES # BLD AUTO: 1.37 K/UL — SIGNIFICANT CHANGE UP (ref 1–3.3)
LYMPHOCYTES # BLD AUTO: 17.1 % — SIGNIFICANT CHANGE UP (ref 13–44)
LYMPHOCYTES # BLD AUTO: 2.45 K/UL — SIGNIFICANT CHANGE UP (ref 1–3.3)
LYMPHOCYTES # BLD AUTO: 40.9 % — SIGNIFICANT CHANGE UP (ref 13–44)
MCHC RBC-ENTMCNC: 28.6 PG — SIGNIFICANT CHANGE UP (ref 27–34)
MCHC RBC-ENTMCNC: 28.6 PG — SIGNIFICANT CHANGE UP (ref 27–34)
MCHC RBC-ENTMCNC: 32.1 GM/DL — SIGNIFICANT CHANGE UP (ref 32–36)
MCHC RBC-ENTMCNC: 32.2 GM/DL — SIGNIFICANT CHANGE UP (ref 32–36)
MCV RBC AUTO: 88.9 FL — SIGNIFICANT CHANGE UP (ref 80–100)
MCV RBC AUTO: 89.3 FL — SIGNIFICANT CHANGE UP (ref 80–100)
MONOCYTES # BLD AUTO: 0.32 K/UL — SIGNIFICANT CHANGE UP (ref 0–0.9)
MONOCYTES # BLD AUTO: 0.47 K/UL — SIGNIFICANT CHANGE UP (ref 0–0.9)
MONOCYTES NFR BLD AUTO: 4 % — SIGNIFICANT CHANGE UP (ref 2–14)
MONOCYTES NFR BLD AUTO: 7.8 % — SIGNIFICANT CHANGE UP (ref 2–14)
NEUTROPHILS # BLD AUTO: 2.75 K/UL — SIGNIFICANT CHANGE UP (ref 1.8–7.4)
NEUTROPHILS # BLD AUTO: 6.25 K/UL — SIGNIFICANT CHANGE UP (ref 1.8–7.4)
NEUTROPHILS NFR BLD AUTO: 46 % — SIGNIFICANT CHANGE UP (ref 43–77)
NEUTROPHILS NFR BLD AUTO: 78 % — HIGH (ref 43–77)
NRBC # BLD: 0 /100 WBCS — SIGNIFICANT CHANGE UP (ref 0–0)
NRBC # BLD: 0 /100 WBCS — SIGNIFICANT CHANGE UP (ref 0–0)
PLATELET # BLD AUTO: 229 K/UL — SIGNIFICANT CHANGE UP (ref 150–400)
PLATELET # BLD AUTO: 257 K/UL — SIGNIFICANT CHANGE UP (ref 150–400)
POTASSIUM SERPL-MCNC: 3.7 MMOL/L — SIGNIFICANT CHANGE UP (ref 3.5–5.3)
POTASSIUM SERPL-MCNC: 4.1 MMOL/L — SIGNIFICANT CHANGE UP (ref 3.5–5.3)
POTASSIUM SERPL-SCNC: 3.7 MMOL/L — SIGNIFICANT CHANGE UP (ref 3.5–5.3)
POTASSIUM SERPL-SCNC: 4.1 MMOL/L — SIGNIFICANT CHANGE UP (ref 3.5–5.3)
PROT SERPL-MCNC: 6.8 GM/DL — SIGNIFICANT CHANGE UP (ref 6–8.3)
PROT SERPL-MCNC: 8.2 GM/DL — SIGNIFICANT CHANGE UP (ref 6–8.3)
RBC # BLD: 3.91 M/UL — SIGNIFICANT CHANGE UP (ref 3.8–5.2)
RBC # BLD: 4.4 M/UL — SIGNIFICANT CHANGE UP (ref 3.8–5.2)
RBC # FLD: 13.4 % — SIGNIFICANT CHANGE UP (ref 10.3–14.5)
RBC # FLD: 13.5 % — SIGNIFICANT CHANGE UP (ref 10.3–14.5)
SODIUM SERPL-SCNC: 136 MMOL/L — SIGNIFICANT CHANGE UP (ref 135–145)
SODIUM SERPL-SCNC: 141 MMOL/L — SIGNIFICANT CHANGE UP (ref 135–145)
WBC # BLD: 5.99 K/UL — SIGNIFICANT CHANGE UP (ref 3.8–10.5)
WBC # BLD: 8.02 K/UL — SIGNIFICANT CHANGE UP (ref 3.8–10.5)
WBC # FLD AUTO: 5.99 K/UL — SIGNIFICANT CHANGE UP (ref 3.8–10.5)
WBC # FLD AUTO: 8.02 K/UL — SIGNIFICANT CHANGE UP (ref 3.8–10.5)

## 2019-11-15 PROCEDURE — 99285 EMERGENCY DEPT VISIT HI MDM: CPT

## 2019-11-15 RX ORDER — HEPARIN SODIUM 5000 [USP'U]/ML
5000 INJECTION INTRAVENOUS; SUBCUTANEOUS EVERY 12 HOURS
Refills: 0 | Status: DISCONTINUED | OUTPATIENT
Start: 2019-11-15 | End: 2019-11-18

## 2019-11-15 RX ORDER — SODIUM CHLORIDE 9 MG/ML
1000 INJECTION, SOLUTION INTRAVENOUS ONCE
Refills: 0 | Status: COMPLETED | OUTPATIENT
Start: 2019-11-15 | End: 2019-11-15

## 2019-11-15 RX ORDER — ONDANSETRON 8 MG/1
4 TABLET, FILM COATED ORAL ONCE
Refills: 0 | Status: COMPLETED | OUTPATIENT
Start: 2019-11-15 | End: 2019-11-15

## 2019-11-15 RX ORDER — MORPHINE SULFATE 50 MG/1
2 CAPSULE, EXTENDED RELEASE ORAL EVERY 6 HOURS
Refills: 0 | Status: DISCONTINUED | OUTPATIENT
Start: 2019-11-15 | End: 2019-11-18

## 2019-11-15 RX ORDER — ONDANSETRON 8 MG/1
4 TABLET, FILM COATED ORAL EVERY 6 HOURS
Refills: 0 | Status: DISCONTINUED | OUTPATIENT
Start: 2019-11-15 | End: 2019-11-18

## 2019-11-15 RX ORDER — SODIUM CHLORIDE 9 MG/ML
1000 INJECTION, SOLUTION INTRAVENOUS
Refills: 0 | Status: DISCONTINUED | OUTPATIENT
Start: 2019-11-15 | End: 2019-11-17

## 2019-11-15 RX ORDER — MORPHINE SULFATE 50 MG/1
4 CAPSULE, EXTENDED RELEASE ORAL ONCE
Refills: 0 | Status: DISCONTINUED | OUTPATIENT
Start: 2019-11-15 | End: 2019-11-15

## 2019-11-15 RX ADMIN — SODIUM CHLORIDE 1000 MILLILITER(S): 9 INJECTION, SOLUTION INTRAVENOUS at 20:46

## 2019-11-15 RX ADMIN — HEPARIN SODIUM 5000 UNIT(S): 5000 INJECTION INTRAVENOUS; SUBCUTANEOUS at 06:53

## 2019-11-15 RX ADMIN — SODIUM CHLORIDE 125 MILLILITER(S): 9 INJECTION, SOLUTION INTRAVENOUS at 06:56

## 2019-11-15 RX ADMIN — MORPHINE SULFATE 4 MILLIGRAM(S): 50 CAPSULE, EXTENDED RELEASE ORAL at 20:46

## 2019-11-15 RX ADMIN — SODIUM CHLORIDE 125 MILLILITER(S): 9 INJECTION, SOLUTION INTRAVENOUS at 22:51

## 2019-11-15 RX ADMIN — ONDANSETRON 4 MILLIGRAM(S): 8 TABLET, FILM COATED ORAL at 20:46

## 2019-11-15 RX ADMIN — MORPHINE SULFATE 4 MILLIGRAM(S): 50 CAPSULE, EXTENDED RELEASE ORAL at 22:35

## 2019-11-15 NOTE — H&P ADULT - ASSESSMENT
28y/o female with symptomatic cholelithiasis, recent episode of gallstone pancreatitis, discharged today, now returns with vomiting and abdominal pain  - Admit to Dr. Nunez  -  GI ppx  - Pain management  -DVT ppx  - NPO/IVF

## 2019-11-15 NOTE — ED ADULT NURSE NOTE - OBJECTIVE STATEMENT
Pt states discharged today from 1B for pancreatitis, was able to tolerate a regular diet ate salmon. pt was discharged stable with no pain. pt states at home today worsening pain right lower abdomen nausea and vomiting. Pt denies diarrhea or fevers. Pt n states she was Instructed by Dr Nunez to return to ER.

## 2019-11-15 NOTE — H&P ADULT - NSHPLABSRESULTS_GEN_ALL_CORE
< from: MR MRCP No Cont (11.14.19 @ 10:05) >    Impression:    Multiple gallstones in the gallbladder. Mild pericholecystic edema is   noted. Findings may represent acute cholecystitis. If clinically   indicated, HIDA scanmay be pursued for further evaluation.    No MR evidence for choledocholithiasis.    No MR evidence for acute pancreatitis. Clinical correlation with   pancreatic enzymes is recommended.    < end of copied text >

## 2019-11-15 NOTE — H&P ADULT - NSHPPHYSICALEXAM_GEN_ALL_CORE
Constitutional: WDWN resting comfortably in bed; NAD  HEENT: NC/AT, PERRL, EOMI, anicteric sclera, no nasal discharge; uvula midline, no oropharyngeal erythema or exudates  Neck: supple; no JVD or thyromegaly  Respiratory: CTA B/L; no W/R/R, no retractions  Cardiac: +S1/S2; RRR; no M/R/G; PMI non-displaced  Gastrointestinal: Obese, (+) BS, soft, non-distended. Tender to palpation in the epigastric region. (-) Zepeda's sign. No guarding, no rebound tenderness  Extremities: , no clubbing or cyanosis; no peripheral edema  Musculoskeletal:  no joint swelling, tenderness or erythema  Vascular: 2+ radial, femoral, DP/PT pulses B/L  Skin: warm, dry and intact; no rashes, wounds, or scars  Neurologic: AAOx3; CNS grossly intact; no focal deficits

## 2019-11-15 NOTE — DISCHARGE NOTE PROVIDER - NSDCFUADDINST_GEN_ALL_CORE_FT
Schedule an appointment to see Dr. Nunez in his office for follow up.      Return to emergency department if you develop any new or worsening symptoms including but not limited to nausea, vomiting, diarrhea, abdominal pain, fevers, chills.

## 2019-11-15 NOTE — ED ADULT TRIAGE NOTE - CHIEF COMPLAINT QUOTE
Pt sts discharged today from 1B for pancreatitis, no surgery, sent home.  symptom worsened after getting home.  Instructed by Dr Nunez to return to ER.

## 2019-11-15 NOTE — PROGRESS NOTE ADULT - ASSESSMENT
30 y/o female with PMHx of gallstones who reports intermittent generalized abdominal pain, radiating to her back after eating.     VSS on presentation without fevers.   Labs on presentation noted normal bili, however repeat jim 2.0 (d 1.25), ast 544, alt 904, alk phos 179, lipase ~3800  CT: no evidence of acute pancreatitis, cholelithiasis and pericholecystic fluid suggestive of cholecystitis   MRCP: no evidence of choledocholithiasis     Clinical picture is consistent with gallstone pancreatitis and cholecystitis without evidence of choledocholithiasis. Recommend conservative management for pancreatitis and surgical management for acute cholecystitis.

## 2019-11-15 NOTE — DISCHARGE NOTE PROVIDER - NSDCCPCAREPLAN_GEN_ALL_CORE_FT
PRINCIPAL DISCHARGE DIAGNOSIS  Diagnosis: Cholecystitis  Assessment and Plan of Treatment: -      SECONDARY DISCHARGE DIAGNOSES  Diagnosis: Gallstone pancreatitis  Assessment and Plan of Treatment: PRINCIPAL DISCHARGE DIAGNOSIS  Diagnosis: Gallstone pancreatitis  Assessment and Plan of Treatment: -pt instructed to follow up outpatient with Dr. Lal for laparoscopic cholecystectomy

## 2019-11-15 NOTE — DISCHARGE NOTE NURSING/CASE MANAGEMENT/SOCIAL WORK - PATIENT PORTAL LINK FT
You can access the FollowMyHealth Patient Portal offered by [facility] by registering at the following website: [email or web address]. By joining [facility]’s FollowMyHealth portal, you will also be able to view your health information using other applications (apps) compatible with our system.

## 2019-11-15 NOTE — DISCHARGE NOTE PROVIDER - CARE PROVIDER_API CALL
Renae Nunez)  Surgery  [address], [address]  [place] [address]  Phone: [phone]  Fax: [phone]  Follow Up Time:

## 2019-11-15 NOTE — DISCHARGE NOTE PROVIDER - NSDCMRMEDTOKEN_GEN_ALL_CORE_FT
ferrous sulfate: 65 milligram(s) orally 3 times a day  Multiple Vitamins oral capsule: 1 cap(s) orally once a day

## 2019-11-15 NOTE — PROGRESS NOTE ADULT - SUBJECTIVE AND OBJECTIVE BOX
Patient seen and examined at bedside with no complaints. Pt has decided that she does not want to be operate on during this admission.  She wants to eat and be discharged.   Pt says she continues to pass gas and also  Denies pain, nausea/ vomiting.   Tolerating diet.    Vital Signs Last 24 Hrs  T(F): 98.2 (11-15-19 @ 05:25), Max: 98.2 (11-15-19 @ 05:25)  HR: 61 (11-15-19 @ 05:25)  BP: 120/65 (11-15-19 @ 05:25)  RR: 17 (11-15-19 @ 05:25)  SpO2: 98% (11-15-19 @ 05:25)  Wt(kg): --   CAPILLARY BLOOD GLUCOSE          GENERAL: Alert, NAD  CHEST/LUNG: Clear to auscultation bilaterally, respirations nonlabored  HEART: S1S2, Regular rate and rhythm;   ABDOMEN: + Bowel sounds, soft, Nontender, Nondistended  EXTREMITIES:  no calf tenderness, No edema    I&O's Detail    14 Nov 2019 07:01  -  15 Nov 2019 07:00  --------------------------------------------------------  IN:    lactated ringers.: 1000 mL  Total IN: 1000 mL    OUT:    Voided: 1000 mL  Total OUT: 1000 mL    Total NET: 0 mL          LABS:                        11.2   5.99  )-----------( 229      ( 15 Nov 2019 06:03 )             34.9     11-15    141  |  108  |  5<L>  ----------------------------<  77  4.1   |  27  |  0.89    Ca    9.1      15 Nov 2019 06:03    TPro  6.8  /  Alb  3.2<L>  /  TBili  0.6  /  DBili  .22<H>  /  AST  196<H>  /  ALT  626<H>  /  AlkPhos  172<H>  11-15        RADIOLOGY & ADDITIONAL STUDIES:     29y old  Female s/p secondary to CHOLECYSTITIS/ GALLSTONE/ PANCREATITIS  ABDOMINAL PAIN, BACKACHE,  , POD# with PMH Gallstones  No pertinent past medical history  No pertinent past medical history    - continue local wound care  - antibiotics per ID  - f/u labs  - DVT prophylaxis, Incentive Spirometer, OOB, Ambulating, pain control Patient seen and examined at bedside with no complaints. Pt has decided that she does not want to be operated on during this admission.  She wants to eat and be discharged.   Pt says she continues to pass gas and also had a BM this morning.    Denies pain, nausea/ vomiting.   Tolerating diet.    Vital Signs Last 24 Hrs  T(F): 98.2 (11-15-19 @ 05:25), Max: 98.2 (11-15-19 @ 05:25)  HR: 61 (11-15-19 @ 05:25)  BP: 120/65 (11-15-19 @ 05:25)  RR: 17 (11-15-19 @ 05:25)  SpO2: 98% (11-15-19 @ 05:25)      GENERAL: Alert, NAD  CHEST/LUNG: Clear to auscultation bilaterally, respirations nonlabored  HEART: S1S2, Regular rate and rhythm;   ABDOMEN: +Bowel sounds, soft, Nontender, Nondistended  EXTREMITIES:  no calf tenderness, No edema    I&O's Detail    14 Nov 2019 07:01  -  15 Nov 2019 07:00  --------------------------------------------------------  IN:    lactated ringers.: 1000 mL  Total IN: 1000 mL    OUT:    Voided: 1000 mL  Total OUT: 1000 mL    Total NET: 0 mL        LABS:                        11.2   5.99  )-----------( 229      ( 15 Nov 2019 06:03 )             34.9     11-15    141  |  108  |  5<L>  ----------------------------<  77  4.1   |  27  |  0.89    Ca    9.1      15 Nov 2019 06:03    TPro  6.8  /  Alb  3.2<L>  /  TBili  0.6  /  DBili  .22<H>  /  AST  196<H>  /  ALT  626<H>  /  AlkPhos  172<H>  11-15        Impression: 28 y/o female with PMHx of gallstones admitted for gallstone pancreatitis, Ct showing cholelithiasis, MRCP negative, bilirubin returned to normal, amylase/lipase downtrending, pt's abdominal pain resolved, no tenderness on exam, afebrile, no leukocytosis.     Plan  -Diet advanced.  If pt tolerating food, d/c planning today.    -Pt does not want surgery on this admission.  Will follow up outpatient with Dr. Nunez for lap maximo.      -Discussed with Dr. Nunez.

## 2019-11-15 NOTE — DISCHARGE NOTE PROVIDER - HOSPITAL COURSE
THIS IS A CASE OF A 28 YO FEMALE WITH KNOWN HISTORY OF GALLSTONES PRESENTED WITH ABDOMINAL PAIN, N/V.        ER CT SHOWED CHOLELITHIASIS WITH PERICHOLECYSTIC STANDING.                PAST MEDICAL & SURGICAL HISTORY:        Gallstones (K80.20)    No pertinent past medical history ([id number])    No pertinent past medical history ([id number])    No significant past surgical history ([id number])        Home Medications:        ferrous sulfate: 65 milligram(s) orally 3 times a day (13 Nov 2019 23:36)    Multiple Vitamins oral capsule: 1 cap(s) orally once a day (13 Nov 2019 23:36)        Allergies        No Known Allergies            HOSPITAL COURSE: SHE WAS ADMITTED FOR OBSERVATION AND FURTHER TESTING. ADMISSION LIPASE WAS HIGH HOWEVER NORMALIZED ON HOSPITAL STAY DAY 1. SHE HAD A ELEVATION IN T. BILIRUBIN ON HOSPITAL DAY 1. A MRCP WAS DONE AND IT WAS NORMAL. GI CONSULT WAS OBTAINED.        T. BILIRUBIN NORMALIZED ON HOSPITAL STAY DAY 2. WITH COMPLETE RESOLUTION OF GI SYMPTOMS.        STABLE, AFEBRILE, TOLERATING REGULAR DIET AND OPT TO GO HOME.          DISPOSITION : HOME. REGULAR DIET. FOLLOW UP WITH DR. OROZCO

## 2019-11-15 NOTE — PROGRESS NOTE ADULT - SUBJECTIVE AND OBJECTIVE BOX
Gastroenterology  Patient seen and examined bedside resting comfortably.  No complaints offered. Tolerating diet     T(F): 98.2 (11-15-19 @ 05:25), Max: 98.2 (11-15-19 @ 05:25)  HR: 61 (11-15-19 @ 05:25) (61 - 69)  BP: 120/65 (11-15-19 @ 05:25) (109/72 - 131/85)  RR: 17 (11-15-19 @ 05:25) (17 - 18)  SpO2: 98% (11-15-19 @ 05:25) (98% - 100%)  Wt(kg): --  CAPILLARY BLOOD GLUCOSE          PHYSICAL EXAM:  General: NAD  Neuro:  Alert & responsive  HEENT: NCAT, EOMI, conjunctiva clear  CV: +S1+S2 regular rate and rhythm  Lung: clear to ausculation bilaterally, respirations nonlabored, good inspiratory effort  Abdomen: soft, Non Tender, No distention Normal active BS  Extremities: no cyanosis, clubbing or edema    LABS:                        11.2   5.99  )-----------( 229      ( 15 Nov 2019 06:03 )             34.9     11-15    141  |  108  |  5<L>  ----------------------------<  77  4.1   |  27  |  0.89    Ca    9.1      15 Nov 2019 06:03    TPro  6.8  /  Alb  3.2<L>  /  TBili  0.6  /  DBili  .22<H>  /  AST  196<H>  /  ALT  626<H>  /  AlkPhos  172<H>  11-15    LIVER FUNCTIONS - ( 15 Nov 2019 06:03 )  Alb: 3.2 g/dL / Pro: 6.8 gm/dL / ALK PHOS: 172 U/L / ALT: 626 U/L / AST: 196 U/L / GGT: x             I&O's Detail    14 Nov 2019 07:01  -  15 Nov 2019 07:00  --------------------------------------------------------  IN:    lactated ringers.: 1000 mL  Total IN: 1000 mL    OUT:    Voided: 1000 mL  Total OUT: 1000 mL    Total NET: 0 mL        11-15 @ 06:03    141 | 108 | 5  /9.1 | -- | --  _______________________/  4.1 | 27 | 0.89                           \par   Amylase, Serum Total: 76 U/L (11-15 @ 06:03)

## 2019-11-16 PROBLEM — K80.20 CALCULUS OF GALLBLADDER WITHOUT CHOLECYSTITIS WITHOUT OBSTRUCTION: Chronic | Status: ACTIVE | Noted: 2019-11-13

## 2019-11-16 LAB
ALBUMIN SERPL ELPH-MCNC: 3.1 G/DL — LOW (ref 3.3–5)
ALP SERPL-CCNC: 150 U/L — HIGH (ref 40–120)
ALT FLD-CCNC: 424 U/L — HIGH (ref 12–78)
ANION GAP SERPL CALC-SCNC: 6 MMOL/L — SIGNIFICANT CHANGE UP (ref 5–17)
AST SERPL-CCNC: 95 U/L — HIGH (ref 15–37)
BILIRUB DIRECT SERPL-MCNC: 0.15 MG/DL — SIGNIFICANT CHANGE UP (ref 0.05–0.2)
BILIRUB INDIRECT FLD-MCNC: 0.2 MG/DL — SIGNIFICANT CHANGE UP (ref 0.2–1)
BILIRUB SERPL-MCNC: 0.4 MG/DL — SIGNIFICANT CHANGE UP (ref 0.2–1.2)
BUN SERPL-MCNC: 6 MG/DL — LOW (ref 7–23)
CALCIUM SERPL-MCNC: 8.5 MG/DL — SIGNIFICANT CHANGE UP (ref 8.5–10.1)
CHLORIDE SERPL-SCNC: 112 MMOL/L — HIGH (ref 96–108)
CO2 SERPL-SCNC: 26 MMOL/L — SIGNIFICANT CHANGE UP (ref 22–31)
CREAT SERPL-MCNC: 0.84 MG/DL — SIGNIFICANT CHANGE UP (ref 0.5–1.3)
GLUCOSE SERPL-MCNC: 109 MG/DL — HIGH (ref 70–99)
HCT VFR BLD CALC: 35.1 % — SIGNIFICANT CHANGE UP (ref 34.5–45)
HGB BLD-MCNC: 11.3 G/DL — LOW (ref 11.5–15.5)
MCHC RBC-ENTMCNC: 28.9 PG — SIGNIFICANT CHANGE UP (ref 27–34)
MCHC RBC-ENTMCNC: 32.2 GM/DL — SIGNIFICANT CHANGE UP (ref 32–36)
MCV RBC AUTO: 89.8 FL — SIGNIFICANT CHANGE UP (ref 80–100)
NRBC # BLD: 0 /100 WBCS — SIGNIFICANT CHANGE UP (ref 0–0)
PLATELET # BLD AUTO: 210 K/UL — SIGNIFICANT CHANGE UP (ref 150–400)
POTASSIUM SERPL-MCNC: 3.4 MMOL/L — LOW (ref 3.5–5.3)
POTASSIUM SERPL-SCNC: 3.4 MMOL/L — LOW (ref 3.5–5.3)
PROT SERPL-MCNC: 6.6 GM/DL — SIGNIFICANT CHANGE UP (ref 6–8.3)
RBC # BLD: 3.91 M/UL — SIGNIFICANT CHANGE UP (ref 3.8–5.2)
RBC # FLD: 13.6 % — SIGNIFICANT CHANGE UP (ref 10.3–14.5)
SODIUM SERPL-SCNC: 144 MMOL/L — SIGNIFICANT CHANGE UP (ref 135–145)
WBC # BLD: 6.57 K/UL — SIGNIFICANT CHANGE UP (ref 3.8–10.5)
WBC # FLD AUTO: 6.57 K/UL — SIGNIFICANT CHANGE UP (ref 3.8–10.5)

## 2019-11-16 RX ORDER — PANTOPRAZOLE SODIUM 20 MG/1
40 TABLET, DELAYED RELEASE ORAL DAILY
Refills: 0 | Status: DISCONTINUED | OUTPATIENT
Start: 2019-11-17 | End: 2019-11-18

## 2019-11-16 RX ORDER — PANTOPRAZOLE SODIUM 20 MG/1
40 TABLET, DELAYED RELEASE ORAL ONCE
Refills: 0 | Status: COMPLETED | OUTPATIENT
Start: 2019-11-16 | End: 2019-11-16

## 2019-11-16 RX ORDER — CALCIUM CARBONATE 500(1250)
2 TABLET ORAL ONCE
Refills: 0 | Status: COMPLETED | OUTPATIENT
Start: 2019-11-16 | End: 2019-11-16

## 2019-11-16 RX ORDER — PANTOPRAZOLE SODIUM 20 MG/1
TABLET, DELAYED RELEASE ORAL
Refills: 0 | Status: DISCONTINUED | OUTPATIENT
Start: 2019-11-16 | End: 2019-11-18

## 2019-11-16 RX ADMIN — HEPARIN SODIUM 5000 UNIT(S): 5000 INJECTION INTRAVENOUS; SUBCUTANEOUS at 05:18

## 2019-11-16 RX ADMIN — PANTOPRAZOLE SODIUM 40 MILLIGRAM(S): 20 TABLET, DELAYED RELEASE ORAL at 12:42

## 2019-11-16 RX ADMIN — HEPARIN SODIUM 5000 UNIT(S): 5000 INJECTION INTRAVENOUS; SUBCUTANEOUS at 17:17

## 2019-11-16 RX ADMIN — SODIUM CHLORIDE 125 MILLILITER(S): 9 INJECTION, SOLUTION INTRAVENOUS at 12:42

## 2019-11-16 RX ADMIN — Medication 2 TABLET(S): at 22:20

## 2019-11-16 RX ADMIN — SODIUM CHLORIDE 125 MILLILITER(S): 9 INJECTION, SOLUTION INTRAVENOUS at 05:18

## 2019-11-16 RX ADMIN — SODIUM CHLORIDE 125 MILLILITER(S): 9 INJECTION, SOLUTION INTRAVENOUS at 21:43

## 2019-11-16 NOTE — PROGRESS NOTE ADULT - SUBJECTIVE AND OBJECTIVE BOX
Patient seen and examined at bedside resting comfortably. Nausea and vomiting has resolved since admission.    Abdominal pain is well controlled.  Denies chest pain, dyspnea, cough.    Vital Signs Last 24 Hrs  T(C): 36.7 (16 Nov 2019 05:20), Max: 37.1 (15 Nov 2019 20:03)  T(F): 98 (16 Nov 2019 05:20), Max: 98.7 (15 Nov 2019 20:03)  HR: 65 (16 Nov 2019 05:20) (64 - 76)  BP: 110/68 (16 Nov 2019 05:20) (110/68 - 141/89)  BP(mean): --  RR: 17 (16 Nov 2019 05:20) (17 - 19)  SpO2: 100% (16 Nov 2019 05:20) (98% - 100%)  I&O's Summary    15 Nov 2019 07:01  -  16 Nov 2019 07:00  --------------------------------------------------------  IN: 800 mL / OUT: 0 mL / NET: 800 mL        PHYSICAL EXAM:  GENERAL: Alert & Oriented X3,NAD, well-groomed, well-developed  CHEST/LUNG: Clear to percussion bilaterally; No rales, rhonchi, wheezing, or rubs  HEART: Regular rate and rhythm; No murmurs, rubs, or gallops  ABDOMEN: Soft, Nontender, Nondistended; Bowel sounds present  EXTREMITIES:  No calf tenderness, No clubbing, cyanosis, or edema      LABS:                        11.3   6.57  )-----------( 210      ( 16 Nov 2019 07:45 )             35.1     11-15    136  |  105  |  10  ----------------------------<  105<H>  3.7   |  26  |  0.97    Ca    9.4      15 Nov 2019 20:45    TPro  8.2  /  Alb  3.8  /  TBili  0.4  /  DBili  x   /  AST  138<H>  /  ALT  593<H>  /  AlkPhos  195<H>  11-15        Culture Results:   >=3 organisms. Probable collection contamination. (11-14 @ 01:27)  Culture Results:   No growth to date. (11-14 @ 01:01)  Culture Results:   No growth to date. (11-14 @ 01:01)        Impression: 30 y/o female with PMHx of gallstones admitted for symptomatic cholelithiasis- stable    Plan  -cld if nausea and vomiting resolves  - Lap maximo planning  - f/u labs  -will discuss with Dr. Nunez

## 2019-11-17 ENCOUNTER — TRANSCRIPTION ENCOUNTER (OUTPATIENT)
Age: 29
End: 2019-11-17

## 2019-11-17 LAB
ALBUMIN SERPL ELPH-MCNC: 3.4 G/DL — SIGNIFICANT CHANGE UP (ref 3.3–5)
ALP SERPL-CCNC: 149 U/L — HIGH (ref 40–120)
ALT FLD-CCNC: 437 U/L — HIGH (ref 12–78)
ANION GAP SERPL CALC-SCNC: 6 MMOL/L — SIGNIFICANT CHANGE UP (ref 5–17)
AST SERPL-CCNC: 166 U/L — HIGH (ref 15–37)
BILIRUB DIRECT SERPL-MCNC: 0.13 MG/DL — SIGNIFICANT CHANGE UP (ref 0.05–0.2)
BILIRUB INDIRECT FLD-MCNC: 0.3 MG/DL — SIGNIFICANT CHANGE UP (ref 0.2–1)
BILIRUB SERPL-MCNC: 0.4 MG/DL — SIGNIFICANT CHANGE UP (ref 0.2–1.2)
BUN SERPL-MCNC: 2 MG/DL — LOW (ref 7–23)
CALCIUM SERPL-MCNC: 8.8 MG/DL — SIGNIFICANT CHANGE UP (ref 8.5–10.1)
CHLORIDE SERPL-SCNC: 112 MMOL/L — HIGH (ref 96–108)
CO2 SERPL-SCNC: 24 MMOL/L — SIGNIFICANT CHANGE UP (ref 22–31)
CREAT SERPL-MCNC: 0.81 MG/DL — SIGNIFICANT CHANGE UP (ref 0.5–1.3)
GLUCOSE SERPL-MCNC: 84 MG/DL — SIGNIFICANT CHANGE UP (ref 70–99)
HCT VFR BLD CALC: 36.3 % — SIGNIFICANT CHANGE UP (ref 34.5–45)
HGB BLD-MCNC: 11.5 G/DL — SIGNIFICANT CHANGE UP (ref 11.5–15.5)
MAGNESIUM SERPL-MCNC: 2 MG/DL — SIGNIFICANT CHANGE UP (ref 1.6–2.6)
MCHC RBC-ENTMCNC: 28.7 PG — SIGNIFICANT CHANGE UP (ref 27–34)
MCHC RBC-ENTMCNC: 31.7 GM/DL — LOW (ref 32–36)
MCV RBC AUTO: 90.5 FL — SIGNIFICANT CHANGE UP (ref 80–100)
NRBC # BLD: 0 /100 WBCS — SIGNIFICANT CHANGE UP (ref 0–0)
PHOSPHATE SERPL-MCNC: 3 MG/DL — SIGNIFICANT CHANGE UP (ref 2.5–4.5)
PLATELET # BLD AUTO: 232 K/UL — SIGNIFICANT CHANGE UP (ref 150–400)
POTASSIUM SERPL-MCNC: 3.6 MMOL/L — SIGNIFICANT CHANGE UP (ref 3.5–5.3)
POTASSIUM SERPL-SCNC: 3.6 MMOL/L — SIGNIFICANT CHANGE UP (ref 3.5–5.3)
PROT SERPL-MCNC: 7.2 GM/DL — SIGNIFICANT CHANGE UP (ref 6–8.3)
RBC # BLD: 4.01 M/UL — SIGNIFICANT CHANGE UP (ref 3.8–5.2)
RBC # FLD: 13.6 % — SIGNIFICANT CHANGE UP (ref 10.3–14.5)
SODIUM SERPL-SCNC: 142 MMOL/L — SIGNIFICANT CHANGE UP (ref 135–145)
WBC # BLD: 6.27 K/UL — SIGNIFICANT CHANGE UP (ref 3.8–10.5)
WBC # FLD AUTO: 6.27 K/UL — SIGNIFICANT CHANGE UP (ref 3.8–10.5)

## 2019-11-17 RX ORDER — DEXTROSE MONOHYDRATE, SODIUM CHLORIDE, AND POTASSIUM CHLORIDE 50; .745; 4.5 G/1000ML; G/1000ML; G/1000ML
1000 INJECTION, SOLUTION INTRAVENOUS
Refills: 0 | Status: DISCONTINUED | OUTPATIENT
Start: 2019-11-17 | End: 2019-11-18

## 2019-11-17 RX ORDER — CALCIUM CARBONATE 500(1250)
1 TABLET ORAL EVERY 8 HOURS
Refills: 0 | Status: DISCONTINUED | OUTPATIENT
Start: 2019-11-17 | End: 2019-11-18

## 2019-11-17 RX ADMIN — DEXTROSE MONOHYDRATE, SODIUM CHLORIDE, AND POTASSIUM CHLORIDE 125 MILLILITER(S): 50; .745; 4.5 INJECTION, SOLUTION INTRAVENOUS at 21:44

## 2019-11-17 RX ADMIN — SODIUM CHLORIDE 125 MILLILITER(S): 9 INJECTION, SOLUTION INTRAVENOUS at 10:01

## 2019-11-17 RX ADMIN — DEXTROSE MONOHYDRATE, SODIUM CHLORIDE, AND POTASSIUM CHLORIDE 125 MILLILITER(S): 50; .745; 4.5 INJECTION, SOLUTION INTRAVENOUS at 12:45

## 2019-11-17 NOTE — PROGRESS NOTE ADULT - SUBJECTIVE AND OBJECTIVE BOX
Patient seen and examined at bedside resting comfortably. c/o heartburn overnight, denies n/v.  Abdominal pain is well controlled. Denies chest pain, dyspnea, cough.    Vital Signs Last 24 Hrs  T(C): 37.1 (17 Nov 2019 05:58), Max: 37.1 (17 Nov 2019 05:58)  T(F): 98.8 (17 Nov 2019 05:58), Max: 98.8 (17 Nov 2019 05:58)  HR: 75 (17 Nov 2019 05:58) (64 - 75)  BP: 112/59 (17 Nov 2019 05:58) (112/59 - 134/87)  BP(mean): --  RR: 19 (17 Nov 2019 05:58) (16 - 20)  SpO2: 100% (17 Nov 2019 05:58) (99% - 100%)  I&O's Summary    16 Nov 2019 07:01  -  17 Nov 2019 07:00  --------------------------------------------------------  IN: 1250 mL / OUT: 0 mL / NET: 1250 mL        PHYSICAL EXAM:  PHYSICAL EXAM:  GENERAL: Alert & Oriented X3,NAD, well-groomed, well-developed  CHEST/LUNG: Clear to percussion bilaterally; No rales, rhonchi, wheezing, or rubs  HEART: Regular rate and rhythm; No murmurs, rubs, or gallops  ABDOMEN: Soft, Nondistended; Bowel sounds present, mild ttp in epigastric region  EXTREMITIES:  No calf tenderness, No clubbing, cyanosis, or edema        LABS: pending             Impression: 28 y/o female with PMHx of gallstones admitted for symptomatic cholelithiasis- stable    Plan  -NPO after midnight for Lap Connie Monday  - f/u labs  -will discuss with Dr. Nunez

## 2019-11-18 ENCOUNTER — RESULT REVIEW (OUTPATIENT)
Age: 29
End: 2019-11-18

## 2019-11-18 LAB
ANION GAP SERPL CALC-SCNC: 6 MMOL/L — SIGNIFICANT CHANGE UP (ref 5–17)
APTT BLD: 36.5 SEC — SIGNIFICANT CHANGE UP (ref 28.5–37)
BLD GP AB SCN SERPL QL: SIGNIFICANT CHANGE UP
BUN SERPL-MCNC: 2 MG/DL — LOW (ref 7–23)
CALCIUM SERPL-MCNC: 9.4 MG/DL — SIGNIFICANT CHANGE UP (ref 8.5–10.1)
CHLORIDE SERPL-SCNC: 109 MMOL/L — HIGH (ref 96–108)
CO2 SERPL-SCNC: 25 MMOL/L — SIGNIFICANT CHANGE UP (ref 22–31)
CREAT SERPL-MCNC: 0.9 MG/DL — SIGNIFICANT CHANGE UP (ref 0.5–1.3)
GLUCOSE SERPL-MCNC: 79 MG/DL — SIGNIFICANT CHANGE UP (ref 70–99)
HCT VFR BLD CALC: 37.5 % — SIGNIFICANT CHANGE UP (ref 34.5–45)
HGB BLD-MCNC: 11.9 G/DL — SIGNIFICANT CHANGE UP (ref 11.5–15.5)
INR BLD: 1.19 RATIO — HIGH (ref 0.88–1.16)
MCHC RBC-ENTMCNC: 28.5 PG — SIGNIFICANT CHANGE UP (ref 27–34)
MCHC RBC-ENTMCNC: 31.7 GM/DL — LOW (ref 32–36)
MCV RBC AUTO: 89.9 FL — SIGNIFICANT CHANGE UP (ref 80–100)
NRBC # BLD: 0 /100 WBCS — SIGNIFICANT CHANGE UP (ref 0–0)
PLATELET # BLD AUTO: 257 K/UL — SIGNIFICANT CHANGE UP (ref 150–400)
POTASSIUM SERPL-MCNC: 3.6 MMOL/L — SIGNIFICANT CHANGE UP (ref 3.5–5.3)
POTASSIUM SERPL-SCNC: 3.6 MMOL/L — SIGNIFICANT CHANGE UP (ref 3.5–5.3)
PROTHROM AB SERPL-ACNC: 13.4 SEC — HIGH (ref 10–12.9)
RBC # BLD: 4.17 M/UL — SIGNIFICANT CHANGE UP (ref 3.8–5.2)
RBC # FLD: 13.6 % — SIGNIFICANT CHANGE UP (ref 10.3–14.5)
SODIUM SERPL-SCNC: 140 MMOL/L — SIGNIFICANT CHANGE UP (ref 135–145)
WBC # BLD: 5.95 K/UL — SIGNIFICANT CHANGE UP (ref 3.8–10.5)
WBC # FLD AUTO: 5.95 K/UL — SIGNIFICANT CHANGE UP (ref 3.8–10.5)

## 2019-11-18 PROCEDURE — 88304 TISSUE EXAM BY PATHOLOGIST: CPT | Mod: 26

## 2019-11-18 RX ORDER — CHLORHEXIDINE GLUCONATE 213 G/1000ML
1 SOLUTION TOPICAL ONCE
Refills: 0 | Status: DISCONTINUED | OUTPATIENT
Start: 2019-11-18 | End: 2019-11-18

## 2019-11-18 RX ORDER — BENZOCAINE AND MENTHOL 5; 1 G/100ML; G/100ML
1 LIQUID ORAL
Refills: 0 | Status: DISCONTINUED | OUTPATIENT
Start: 2019-11-18 | End: 2019-11-20

## 2019-11-18 RX ORDER — MORPHINE SULFATE 50 MG/1
4 CAPSULE, EXTENDED RELEASE ORAL EVERY 4 HOURS
Refills: 0 | Status: DISCONTINUED | OUTPATIENT
Start: 2019-11-18 | End: 2019-11-20

## 2019-11-18 RX ORDER — OXYCODONE AND ACETAMINOPHEN 5; 325 MG/1; MG/1
1 TABLET ORAL EVERY 4 HOURS
Refills: 0 | Status: DISCONTINUED | OUTPATIENT
Start: 2019-11-18 | End: 2019-11-19

## 2019-11-18 RX ORDER — HEPARIN SODIUM 5000 [USP'U]/ML
5000 INJECTION INTRAVENOUS; SUBCUTANEOUS EVERY 8 HOURS
Refills: 0 | Status: DISCONTINUED | OUTPATIENT
Start: 2019-11-18 | End: 2019-11-20

## 2019-11-18 RX ORDER — SODIUM CHLORIDE 9 MG/ML
1000 INJECTION, SOLUTION INTRAVENOUS
Refills: 0 | Status: DISCONTINUED | OUTPATIENT
Start: 2019-11-18 | End: 2019-11-18

## 2019-11-18 RX ORDER — SODIUM CHLORIDE 9 MG/ML
1000 INJECTION, SOLUTION INTRAVENOUS
Refills: 0 | Status: DISCONTINUED | OUTPATIENT
Start: 2019-11-18 | End: 2019-11-19

## 2019-11-18 RX ORDER — HYDROMORPHONE HYDROCHLORIDE 2 MG/ML
0.5 INJECTION INTRAMUSCULAR; INTRAVENOUS; SUBCUTANEOUS
Refills: 0 | Status: DISCONTINUED | OUTPATIENT
Start: 2019-11-18 | End: 2019-11-18

## 2019-11-18 RX ADMIN — SODIUM CHLORIDE 75 MILLILITER(S): 9 INJECTION, SOLUTION INTRAVENOUS at 17:36

## 2019-11-18 RX ADMIN — HYDROMORPHONE HYDROCHLORIDE 0.5 MILLIGRAM(S): 2 INJECTION INTRAMUSCULAR; INTRAVENOUS; SUBCUTANEOUS at 17:35

## 2019-11-18 RX ADMIN — DEXTROSE MONOHYDRATE, SODIUM CHLORIDE, AND POTASSIUM CHLORIDE 125 MILLILITER(S): 50; .745; 4.5 INJECTION, SOLUTION INTRAVENOUS at 05:27

## 2019-11-18 RX ADMIN — SODIUM CHLORIDE 100 MILLILITER(S): 9 INJECTION, SOLUTION INTRAVENOUS at 21:40

## 2019-11-18 RX ADMIN — DEXTROSE MONOHYDRATE, SODIUM CHLORIDE, AND POTASSIUM CHLORIDE 125 MILLILITER(S): 50; .745; 4.5 INJECTION, SOLUTION INTRAVENOUS at 14:06

## 2019-11-18 NOTE — PROGRESS NOTE ADULT - SUBJECTIVE AND OBJECTIVE BOX
Pre-operative Note    - Pre-operative Diagnosis: gallstone pancreatitis     - Procedure: laparoscopic cholecystectomy     - Labs:  f/u am labs                         11.5   6.27  )-----------( 232      ( 17 Nov 2019 12:41 )             36.3     11-17    142  |  112<H>  |  2<L>  ----------------------------<  84  3.6   |  24  |  0.81    Ca    8.8      17 Nov 2019 12:41  Phos  3.0     11-17  Mg     2.0     11-17    TPro  7.2  /  Alb  3.4  /  TBili  0.4  /  DBili  .13  /  AST  166<H>  /  ALT  437<H>  /  AlkPhos  149<H>  11-17      Type & Screen #1: ordered in AM labs     - Blood: Not needed.     - Orders:  > NPO at midnight  > Perioperative antibiotics not needed.  > Morning Labs: CBC, BMP, coags, type & screen    - Permits:  > Consent in chart.  > Case scheduled with OR.

## 2019-11-18 NOTE — BRIEF OPERATIVE NOTE - NSICDXBRIEFPROCEDURE_GEN_ALL_CORE_FT
PROCEDURES:  Celioscopic cholecystectomy 18-Nov-2019 17:27:57  Abad Garcia  Laparoscopic cholecystectomies 18-Nov-2019 17:27:56  Abad Garcia

## 2019-11-18 NOTE — PROGRESS NOTE ADULT - SUBJECTIVE AND OBJECTIVE BOX
29y year old Female POD#0 s/p lap maximo.      Patient is seen and examined at bedside.   Pt reports some irritation around her lips and sore throat, which are both improving.    Admits to abdominal pain, well controlled with medication.   As per pt, she was experiencing urge incontinence so primafit was placed.   Pt tolerating clear liquids.  Denies nausea/vomiting.    Denies chest pain, shortness of breath.      Vital Signs Last 24 Hrs  T(F): 98.4 (11-18-19 @ 21:15), Max: 99.2 (11-18-19 @ 19:15)  HR: 74 (11-18-19 @ 21:15)  BP: 127/84 (11-18-19 @ 21:15)  RR: 18 (11-18-19 @ 21:15)  SpO2: 100% (11-18-19 @ 21:15)      GENERAL: Alert, NAD  CHEST/LUNG: respirations nonlabored  HEART: +S1S2, regular rate and rhythm  ABDOMEN: soft, nondistended. Appropriate incisional tenderness. Dressings clean dry intact over trocar sites.    EXTREMITIES:  no calf tenderness, no edema.     Impression: 29y old Female POD #0 s/p lap maximo, pain well-controlled, VSS.        Plan  -advance diet as tolerated  -cepacol given for sore throat   - f/u AM labs  - continue VTE prophylaxis: heparin, encouraged OOB/ambulating   - possible d/c tomorrow   - will discuss with Dr. Nunez

## 2019-11-19 ENCOUNTER — TRANSCRIPTION ENCOUNTER (OUTPATIENT)
Age: 29
End: 2019-11-19

## 2019-11-19 DIAGNOSIS — K80.00 CALCULUS OF GALLBLADDER WITH ACUTE CHOLECYSTITIS WITHOUT OBSTRUCTION: ICD-10-CM

## 2019-11-19 DIAGNOSIS — K85.10 BILIARY ACUTE PANCREATITIS WITHOUT NECROSIS OR INFECTION: ICD-10-CM

## 2019-11-19 LAB
ANION GAP SERPL CALC-SCNC: 6 MMOL/L — SIGNIFICANT CHANGE UP (ref 5–17)
BUN SERPL-MCNC: 5 MG/DL — LOW (ref 7–23)
CALCIUM SERPL-MCNC: 9.2 MG/DL — SIGNIFICANT CHANGE UP (ref 8.5–10.1)
CHLORIDE SERPL-SCNC: 108 MMOL/L — SIGNIFICANT CHANGE UP (ref 96–108)
CO2 SERPL-SCNC: 25 MMOL/L — SIGNIFICANT CHANGE UP (ref 22–31)
CREAT SERPL-MCNC: 0.85 MG/DL — SIGNIFICANT CHANGE UP (ref 0.5–1.3)
CULTURE RESULTS: SIGNIFICANT CHANGE UP
CULTURE RESULTS: SIGNIFICANT CHANGE UP
GLUCOSE SERPL-MCNC: 99 MG/DL — SIGNIFICANT CHANGE UP (ref 70–99)
HCT VFR BLD CALC: 36.1 % — SIGNIFICANT CHANGE UP (ref 34.5–45)
HGB BLD-MCNC: 11.7 G/DL — SIGNIFICANT CHANGE UP (ref 11.5–15.5)
MAGNESIUM SERPL-MCNC: 1.9 MG/DL — SIGNIFICANT CHANGE UP (ref 1.6–2.6)
MCHC RBC-ENTMCNC: 28.5 PG — SIGNIFICANT CHANGE UP (ref 27–34)
MCHC RBC-ENTMCNC: 32.4 GM/DL — SIGNIFICANT CHANGE UP (ref 32–36)
MCV RBC AUTO: 88 FL — SIGNIFICANT CHANGE UP (ref 80–100)
NRBC # BLD: 0 /100 WBCS — SIGNIFICANT CHANGE UP (ref 0–0)
PHOSPHATE SERPL-MCNC: 3.1 MG/DL — SIGNIFICANT CHANGE UP (ref 2.5–4.5)
PLATELET # BLD AUTO: 245 K/UL — SIGNIFICANT CHANGE UP (ref 150–400)
POTASSIUM SERPL-MCNC: 4 MMOL/L — SIGNIFICANT CHANGE UP (ref 3.5–5.3)
POTASSIUM SERPL-SCNC: 4 MMOL/L — SIGNIFICANT CHANGE UP (ref 3.5–5.3)
RBC # BLD: 4.1 M/UL — SIGNIFICANT CHANGE UP (ref 3.8–5.2)
RBC # FLD: 13.4 % — SIGNIFICANT CHANGE UP (ref 10.3–14.5)
SODIUM SERPL-SCNC: 139 MMOL/L — SIGNIFICANT CHANGE UP (ref 135–145)
SPECIMEN SOURCE: SIGNIFICANT CHANGE UP
SPECIMEN SOURCE: SIGNIFICANT CHANGE UP
WBC # BLD: 11.14 K/UL — HIGH (ref 3.8–10.5)
WBC # FLD AUTO: 11.14 K/UL — HIGH (ref 3.8–10.5)

## 2019-11-19 RX ORDER — ACETAMINOPHEN 500 MG
650 TABLET ORAL EVERY 6 HOURS
Refills: 0 | Status: DISCONTINUED | OUTPATIENT
Start: 2019-11-19 | End: 2019-11-20

## 2019-11-19 RX ADMIN — BENZOCAINE AND MENTHOL 1 LOZENGE: 5; 1 LIQUID ORAL at 06:04

## 2019-11-19 RX ADMIN — Medication 650 MILLIGRAM(S): at 06:36

## 2019-11-19 NOTE — DISCHARGE NOTE PROVIDER - CARE PROVIDER_API CALL
Renae Nunez)  Surgery  [address], [address]  [place] [address]  Phone: [phone]  Fax: [phone]  Follow Up Time:

## 2019-11-19 NOTE — DISCHARGE NOTE PROVIDER - NSDCMRMEDTOKEN_GEN_ALL_CORE_FT
ferrous sulfate: 65 milligram(s) orally 3 times a day  Multiple Vitamins oral capsule: 1 cap(s) orally once a day  Percocet 5/325 oral tablet: 1 tab(s) orally every 4 to 6 hours, As Needed -for severe pain MDD:6 tabs

## 2019-11-19 NOTE — PROGRESS NOTE ADULT - SUBJECTIVE AND OBJECTIVE BOX
Patient seen and examined at bedside.  No acute events overnight.    Pt reports her pain is well-controlled.  Pt c/o mild back pain.  Requesting "less strong" medication for the pain.    Pt ate cereal this morning.  Denies nausea/ vomiting.   OOB/ambulating.      Vital Signs Last 24 Hrs  T(F): 98.3 (11-19-19 @ 05:15), Max: 99.2 (11-18-19 @ 19:15)  HR: 79 (11-19-19 @ 05:15)  BP: 112/66 (11-19-19 @ 05:15)  RR: 17 (11-19-19 @ 05:15)  SpO2: 99% (11-19-19 @ 05:15)      GENERAL: Alert, NAD  CHEST/LUNG: respirations nonlabored  HEART: +S1S2, regular rate and rhythm  ABDOMEN: soft, nondistended. Appropriate incisional tenderness. Dressings c/d/i over trocar sites.    EXTREMITIES:  no calf tenderness, no edema.       I&O's Detail    18 Nov 2019 07:01  -  19 Nov 2019 07:00  --------------------------------------------------------  IN:    lactated ringers.: 75 mL    Oral Fluid: 100 mL  Total IN: 175 mL    OUT:    Voided: 1400 mL  Total OUT: 1400 mL    Total NET: -1225 mL          LABS:                        11.7   11.14 )-----------( 245      ( 19 Nov 2019 06:21 )             36.1     11-19    139  |  108  |  5<L>  ----------------------------<  99  4.0   |  25  |  0.85    Ca    9.2      19 Nov 2019 06:21  Phos  3.1     11-19  Mg     1.9     11-19    TPro  7.2  /  Alb  3.4  /  TBili  0.4  /  DBili  .13  /  AST  166<H>  /  ALT  437<H>  /  AlkPhos  149<H>  11-17    PT/INR - ( 18 Nov 2019 07:10 )   PT: 13.4 sec;   INR: 1.19 ratio         PTT - ( 18 Nov 2019 07:10 )  PTT:36.5 sec    Impression: 29y old Female s/p lap maximo, pain well-controlled, VSS.        Plan  -D/c today if tolerating regular diet.    - f/u AM labs  - OOB/ambulating   - will discuss with Dr. Nunez

## 2019-11-19 NOTE — DISCHARGE NOTE PROVIDER - HOSPITAL COURSE
Patient is a 29 year old female with h/o gallstone pancreatitis who presented to the ER on 11/15 c/o abdominal pain and nausea.     Patient was admitted and underwent a lap maximo on 11/18.    Upon discharge, patient was hemodynamically stable, tolerating a regular diet, and her pain was well controlled.

## 2019-11-19 NOTE — DISCHARGE NOTE PROVIDER - NSDCCPCAREPLAN_GEN_ALL_CORE_FT
PRINCIPAL DISCHARGE DIAGNOSIS  Diagnosis: Gallstone pancreatitis  Assessment and Plan of Treatment: Drink plenty of fluids to prevent constipation and fruit juices without pulp that are high in vitamin C. Rest as needed. Do not lift anything heavier than 10 pounds. You may take motrin or advil for mild pain as needed, in addition to prescribed narcotic medication. You may take over the counter stool softeners as needed. Call for any fever over 101, nausea, vomiting, severe pain, no passing of gas or bowel movement. You may shower and pat dry abdomen. Leave the white steri strips in place; they will fall off in 5-7 days.

## 2019-11-20 ENCOUNTER — TRANSCRIPTION ENCOUNTER (OUTPATIENT)
Age: 29
End: 2019-11-20

## 2019-11-20 VITALS
OXYGEN SATURATION: 98 % | RESPIRATION RATE: 18 BRPM | DIASTOLIC BLOOD PRESSURE: 67 MMHG | TEMPERATURE: 98 F | SYSTOLIC BLOOD PRESSURE: 121 MMHG | HEART RATE: 66 BPM

## 2019-11-20 LAB — SURGICAL PATHOLOGY STUDY: SIGNIFICANT CHANGE UP

## 2019-11-20 RX ADMIN — BENZOCAINE AND MENTHOL 1 LOZENGE: 5; 1 LIQUID ORAL at 06:35

## 2019-11-20 RX ADMIN — Medication 650 MILLIGRAM(S): at 06:33

## 2019-11-20 RX ADMIN — HEPARIN SODIUM 5000 UNIT(S): 5000 INJECTION INTRAVENOUS; SUBCUTANEOUS at 06:04

## 2019-11-20 NOTE — PROGRESS NOTE ADULT - SUBJECTIVE AND OBJECTIVE BOX
Patient seen and examined bedside resting comfortably. No acute overnight events and no complaints offered.  Abdominal pain is well controlled.  (+) flatus, denies BM  Denies nausea and vomiting. Tolerating diet.  Denies chest pain, dyspnea, cough.    T(F): 98 (11-20-19 @ 05:00), Max: 99.1 (11-19-19 @ 17:17)  HR: 66 (11-20-19 @ 05:00) (66 - 79)  BP: 121/67 (11-20-19 @ 05:00) (118/72 - 129/81)  RR: 18 (11-20-19 @ 05:00) (17 - 18)  SpO2: 98% (11-20-19 @ 05:00) (98% - 100%)    PHYSICAL EXAM:  General: NAD, A&Ox3  Lung: Respirations nonlabored, good inspiratory effort   ABDOMEN: Soft, nondistended. Appropriate incisional tenderness. Dressings CDI  EXTREMITIES: Warm, no pedal edema    LABS:                        11.7   11.14 )-----------( 245      ( 19 Nov 2019 06:21 )             36.1     11-19    139  |  108  |  5<L>  ----------------------------<  99  4.0   |  25  |  0.85    Ca    9.2      19 Nov 2019 06:21  Phos  3.1     11-19  Mg     1.9     11-19    I&O's Detail    19 Nov 2019 07:01  -  20 Nov 2019 07:00  --------------------------------------------------------  IN:    Oral Fluid: 150 mL  Total IN: 150 mL    OUT:  Total OUT: 0 mL    Total NET: 150 mL    Culture Results:   >=3 organisms. Probable collection contamination. (11-14 @ 01:27)  Culture Results:   No growth at 5 days. (11-14 @ 01:01)  Culture Results:   No growth at 5 days. (11-14 @ 01:01)    Impression: 28 y/o female POD#2 s/p lap maximo    Plan  - Surgically stable for discharge home  - Will discuss with Dr. Nunez Patient seen and examined bedside resting comfortably. No acute overnight events and no complaints offered. Lip swelling has resolved.  Abdominal pain is well controlled.  (+) flatus, denies BM  Denies nausea and vomiting. Tolerating diet.  Denies chest pain, dyspnea, cough.    T(F): 98 (11-20-19 @ 05:00), Max: 99.1 (11-19-19 @ 17:17)  HR: 66 (11-20-19 @ 05:00) (66 - 79)  BP: 121/67 (11-20-19 @ 05:00) (118/72 - 129/81)  RR: 18 (11-20-19 @ 05:00) (17 - 18)  SpO2: 98% (11-20-19 @ 05:00) (98% - 100%)    PHYSICAL EXAM:  General: NAD, A&Ox3  Lung: Respirations nonlabored, good inspiratory effort   ABDOMEN: Soft, nondistended. Appropriate incisional tenderness. Dressings CDI  EXTREMITIES: Warm, no pedal edema    LABS:                        11.7   11.14 )-----------( 245      ( 19 Nov 2019 06:21 )             36.1     11-19    139  |  108  |  5<L>  ----------------------------<  99  4.0   |  25  |  0.85    Ca    9.2      19 Nov 2019 06:21  Phos  3.1     11-19  Mg     1.9     11-19    I&O's Detail    19 Nov 2019 07:01  -  20 Nov 2019 07:00  --------------------------------------------------------  IN:    Oral Fluid: 150 mL  Total IN: 150 mL    OUT:  Total OUT: 0 mL    Total NET: 150 mL    Culture Results:   >=3 organisms. Probable collection contamination. (11-14 @ 01:27)  Culture Results:   No growth at 5 days. (11-14 @ 01:01)  Culture Results:   No growth at 5 days. (11-14 @ 01:01)    Impression: 30 y/o female POD#2 s/p lap maximo    Plan  - cont lowfat diet  - pain management prn  -d/c home today

## 2019-11-20 NOTE — DISCHARGE NOTE NURSING/CASE MANAGEMENT/SOCIAL WORK - PATIENT PORTAL LINK FT
You can access the FollowMyHealth Patient Portal offered by [facility] by registering at the following website: [email or web address]. By joining [facility]’s FollowMyHealth portal, you will also be able to view your health information using other applications (apps) compatible with our system.

## 2019-12-04 DIAGNOSIS — K85.10 BILIARY ACUTE PANCREATITIS WITHOUT NECROSIS OR INFECTION: ICD-10-CM

## 2019-12-04 DIAGNOSIS — K80.10 CALCULUS OF GALLBLADDER WITH CHRONIC CHOLECYSTITIS WITHOUT OBSTRUCTION: ICD-10-CM

## 2019-12-04 DIAGNOSIS — N39.41 URGE INCONTINENCE: ICD-10-CM

## 2020-08-10 NOTE — ED ADULT NURSE NOTE - CCCP TRG CHIEF CMPLNT
Continue present medications.    Remain active as tolerated and monitor blood pressure regularly.    Echocardiogram in 3 months    Cardiac rehab as scheduled     Cholesterol test in 2-3 months; baseline lab in 2 weeks    Referrals to Dermatology and Primary Primary physician in the next few weeks    Call us at [phone], if:   Instructions are unclear  or   You have any questions/ concerns  or    1 week after tests, if you have not received your test results.    Follow up in 6 months or sooner if needed.     flank pain

## 2022-01-24 ENCOUNTER — APPOINTMENT (OUTPATIENT)
Dept: OBGYN | Facility: CLINIC | Age: 32
End: 2022-01-24
Payer: COMMERCIAL

## 2022-01-24 VITALS
BODY MASS INDEX: 32.66 KG/M2 | HEIGHT: 66 IN | WEIGHT: 203.25 LBS | SYSTOLIC BLOOD PRESSURE: 136 MMHG | DIASTOLIC BLOOD PRESSURE: 86 MMHG

## 2022-01-24 DIAGNOSIS — Z78.9 OTHER SPECIFIED HEALTH STATUS: ICD-10-CM

## 2022-01-24 DIAGNOSIS — O02.1 MISSED ABORTION: ICD-10-CM

## 2022-01-24 DIAGNOSIS — D50.9 IRON DEFICIENCY ANEMIA, UNSPECIFIED: ICD-10-CM

## 2022-01-24 DIAGNOSIS — Z80.3 FAMILY HISTORY OF MALIGNANT NEOPLASM OF BREAST: ICD-10-CM

## 2022-01-24 PROCEDURE — 99385 PREV VISIT NEW AGE 18-39: CPT

## 2022-01-24 RX ORDER — PREDNISONE 20 MG/1
20 TABLET ORAL
Qty: 10 | Refills: 0 | Status: COMPLETED | COMMUNITY
Start: 2021-10-01 | End: 2022-01-24

## 2022-01-24 RX ORDER — AZELASTINE HYDROCHLORIDE 137 UG/1
0.1 SPRAY, METERED NASAL
Qty: 30 | Refills: 0 | Status: COMPLETED | COMMUNITY
Start: 2021-10-01 | End: 2022-01-24

## 2022-01-24 NOTE — PHYSICAL EXAM
[Chaperone Declined] : Patient declined chaperone [Appropriately responsive] : appropriately responsive [Alert] : alert [No Acute Distress] : no acute distress [No Lymphadenopathy] : no lymphadenopathy [No Murmurs] : no murmurs [Soft] : soft [Non-tender] : non-tender [Non-distended] : non-distended [No HSM] : No HSM [No Lesions] : no lesions [No Mass] : no mass [Oriented x3] : oriented x3 [Examination Of The Breasts] : a normal appearance [No Masses] : no breast masses were palpable [Labia Majora] : normal [Labia Minora] : normal [Normal] : normal [Uterine Adnexae] : normal

## 2022-01-25 ENCOUNTER — TRANSCRIPTION ENCOUNTER (OUTPATIENT)
Age: 32
End: 2022-01-25

## 2022-01-27 ENCOUNTER — APPOINTMENT (OUTPATIENT)
Dept: CARDIOLOGY | Facility: CLINIC | Age: 32
End: 2022-01-27
Payer: COMMERCIAL

## 2022-01-27 ENCOUNTER — APPOINTMENT (OUTPATIENT)
Dept: ELECTROPHYSIOLOGY | Facility: CLINIC | Age: 32
End: 2022-01-27
Payer: COMMERCIAL

## 2022-01-27 ENCOUNTER — NON-APPOINTMENT (OUTPATIENT)
Age: 32
End: 2022-01-27

## 2022-01-27 VITALS — OXYGEN SATURATION: 100 % | HEART RATE: 63 BPM | SYSTOLIC BLOOD PRESSURE: 144 MMHG | DIASTOLIC BLOOD PRESSURE: 78 MMHG

## 2022-01-27 DIAGNOSIS — K21.9 GASTRO-ESOPHAGEAL REFLUX DISEASE W/OUT ESOPHAGITIS: ICD-10-CM

## 2022-01-27 LAB — HPV HIGH+LOW RISK DNA PNL CVX: NOT DETECTED

## 2022-01-27 PROCEDURE — 93000 ELECTROCARDIOGRAM COMPLETE: CPT

## 2022-01-27 PROCEDURE — 99203 OFFICE O/P NEW LOW 30 MIN: CPT

## 2022-01-28 PROBLEM — K21.9 ACID REFLUX: Status: ACTIVE | Noted: 2022-01-28

## 2022-01-28 NOTE — DISCUSSION/SUMMARY
[FreeTextEntry1] : The patient is a 31-year-old female s/p cholecystectomy with abdominal discomfort and palpitations. \par #1 Cv- normal ECG, echo and event monitor\par #2 GI- s/p cholecystectomy with increase need for antacids, refer back to GI, may be triggering symptoms\par #3 General- We discussed adherence to a Mediterranean diet, weight loss and at least 30 minutes of daily exercise.\par

## 2022-01-28 NOTE — HISTORY OF PRESENT ILLNESS
[FreeTextEntry1] : 32yo  LMP  here for annual exam\par on Nuvaring  for BC x 3 years\par \par  x 3 \par no GYN complaints

## 2022-01-28 NOTE — HISTORY OF PRESENT ILLNESS
[FreeTextEntry1] : Janelle is a 31-year-old female with palpitations. Heart skips a beat throughout the day that makes her cough. Not fast just skipping. No lightheadedness, dizziness or SOB. Not at night. Better after meals. Under more stress. After cholecystectomy had to start antacid which helps so does not feel like she has any acid.

## 2022-02-03 PROCEDURE — 93244 EXT ECG>48HR<7D REV&INTERPJ: CPT

## 2022-02-05 ENCOUNTER — TRANSCRIPTION ENCOUNTER (OUTPATIENT)
Age: 32
End: 2022-02-05

## 2022-02-08 ENCOUNTER — APPOINTMENT (OUTPATIENT)
Dept: CARDIOLOGY | Facility: CLINIC | Age: 32
End: 2022-02-08
Payer: COMMERCIAL

## 2022-02-08 PROCEDURE — 93306 TTE W/DOPPLER COMPLETE: CPT

## 2022-02-24 ENCOUNTER — APPOINTMENT (OUTPATIENT)
Dept: OBGYN | Facility: CLINIC | Age: 32
End: 2022-02-24

## 2022-03-01 LAB — CYTOLOGY CVX/VAG DOC THIN PREP: ABNORMAL

## 2022-04-01 ENCOUNTER — APPOINTMENT (OUTPATIENT)
Dept: OBGYN | Facility: CLINIC | Age: 32
End: 2022-04-01
Payer: COMMERCIAL

## 2022-04-01 VITALS — DIASTOLIC BLOOD PRESSURE: 70 MMHG | HEIGHT: 66 IN | SYSTOLIC BLOOD PRESSURE: 122 MMHG

## 2022-04-01 DIAGNOSIS — R00.2 PALPITATIONS: ICD-10-CM

## 2022-04-01 PROCEDURE — 99213 OFFICE O/P EST LOW 20 MIN: CPT | Mod: NC

## 2022-04-01 NOTE — HISTORY OF PRESENT ILLNESS
[FreeTextEntry1] : 32yo P3 ~ 2 wks ago here for repeat pap for unsatisfactory pap 2nd to blood likely from menses\par \par no pelvic pain\par \par \par s/p cards eval for palpitations\par likely related to malnutrition, not eating throughout day\par note pt fasted for  Oriental orthodox w return of palpitations--priving theory

## 2022-04-01 NOTE — PHYSICAL EXAM
[Chaperone Declined] : Patient declined chaperone [Appropriately responsive] : appropriately responsive [No Acute Distress] : no acute distress [Non-tender] : non-tender [Soft] : soft [Oriented x3] : oriented x3 [Labia Majora] : normal [Labia Minora] : normal [Normal] : normal [Uterine Adnexae] : normal

## 2022-04-03 LAB — HPV HIGH+LOW RISK DNA PNL CVX: NOT DETECTED

## 2022-04-15 LAB — CYTOLOGY CVX/VAG DOC THIN PREP: NORMAL

## 2023-01-04 NOTE — COUNSELING
Behavioral Health Consultation  Cecilia Little, Ph.D.  Psychologist  1/4/2023  1:09 PM EST      Time spent with Patient: 20 minutes  This is patient's  twelfth  [facility] appointment. Reason for Consult:    Chief Complaint   Patient presents with    Anxiety     Referring Provider: JULIET Luna CNP  [address] [address] ([facility]) [address] [place] [address]    Feedback given to PCP. TELEHEALTH VISIT -- Audio Only (During UQAQX-95 public health emergency)    Pursuant to the emergency declaration under the [address] [place] [address], [facility] waiver authority and the [employer] Act, this phone call was conducted, with patient's consent, to reduce the patient's risk of exposure to COVID-19 and provide continuity of care for an established patient. Services were provided through a phone call discussion to substitute for in-person clinic visit. Pt gave verbal informed consent to participate in telehealth services. Consent:  She and/or health care decision maker is aware that that she may receive a bill for this telephone service, depending on her insurance coverage, and has provided verbal consent to proceed: Yes    Conducted a risk-benefit analysis and determined that the patient's presenting problems are consistent with the use of telepsychology. Determined that the patient has sufficient knowledge and skills in the use of technology enabling them to adequately benefit from telepsychology. It was determined that this patient was able to be properly treated without an in-person session. Patient verified that they were currently located at the [facility] address that was provided during registration.     Verified the following information:  Patient's identification: Yes  Patient location: [address] [place] [address]  Patient's call back number: [phone]  Patient's emergency contact's name and number, as well as permission to contact them if [Nutrition/ Exercise/ Weight Management] : nutrition, exercise, weight management needed: Extended Emergency Contact Information  Primary Emergency Contact: Siss[facility] Phone: [phone]  Mobile Phone: [phone]  Relation: Child  Secondary Emergency Contact: Concepción Mayorga  Home Phone: [phone]  Relation: Daughter-in-Law   needed? No    Provider location: Har[facility] Gaona[address] [place], [place] [address]y affirm this is an episode with a patient who has not had a related appointment within my department in the past 7 days or scheduled within the next 24 hours. S:  Patient has been living at the [facility] for about 1 week. Is adjusting to her new living situation. Processed her family's reaction to her move. She is taking a driving test tomorrow and worries about driving in traffic. However, she values maintaining her independence. Has plans to be more involved with the communities at [facility]. Is also planning to discuss her weight with PCP, is worried to lose much more.       O:  MSE:    Attitude: cooperative and friendly  Consciousness: alert  Orientation: oriented to person, place, time, general circumstance  Memory: recent and remote memory intact  Attention/Concentration: intact during session  Speech: normal rate and volume, well-articulated  Mood: anxious  Affect: euthymic  Perception: within normal limits  Thought Content: all-or-none thinking and intrusive thoughts  Thought Process: logical, coherent and goal-directed  Insight: good  Judgment: intact  Ability to understand instructions: Yes  Ability to respond meaningfully: Yes  Morbid Ideation: no   Suicide Assessment: no suicidal ideation, plan, or intent  Homicidal Ideation: no    History:    Medications:   Current Outpatient Medications   Medication Sig Dispense Refill    Continuous Blood Gluc Sensor (FREESTYLE ANT 2 SENSOR) MISC 1 Device by Does not apply route every 14 days 6 each 4    Continuous Blood Gluc  (FREESTYLE ANT 2 READER) GILMER 1 each by Does not apply route daily 1 each 1    buPROPion (WELLBUTRIN XL) 150 MG extended release tablet Take 1 tablet by mouth every morning 90 tablet 3    donepezil (ARICEPT) 10 MG tablet Take 1 tablet by mouth nightly 90 tablet 1    DULoxetine (CYMBALTA) 20 MG extended release capsule Take 1 capsule by mouth daily 90 capsule 1    glipiZIDE (GLUCOTROL) 10 MG tablet TAKE 1 TABLET BY MOUTH EVERY DAY 90 tablet 1    linaclotide (LINZESS) 145 MCG capsule Take 1 capsule by mouth every morning (before breakfast) 90 capsule 1    ELIQUIS 5 MG TABS tablet TAKE 1 TABLET BY MOUTH TWICE DAILY 180 tablet 1    rosuvastatin (CRESTOR) 40 MG tablet TAKE 1 TABLET BY MOUTH EVERY DAY 90 tablet 1    lisinopril (PRINIVIL;ZESTRIL) 10 MG tablet TAKE 1 TABLET BY MOUTH EVERY DAY 90 tablet 1    diclofenac sodium (VOLTAREN) 1 % GEL Apply 4 g topically 4 times daily 100 g 1    metoprolol tartrate (LOPRESSOR) 25 MG tablet TAKE 1 TABLET TWICE A  tablet 1    VASCEPA 1 g CAPS capsule Take 2 capsules by mouth 2 times daily 360 capsule 1    divalproex (DEPAKOTE) 250 MG DR tablet Take 2 tablets by mouth nightly 180 tablet 1    aspirin 81 MG chewable tablet Take 1 tablet by mouth daily 30 tablet 1    Calcium Carb-Cholecalciferol (CALCIUM 1000 + D) 1000-800 MG-UNIT TABS Take 1,000 mg by mouth daily 90 tablet 1    LANTUS SOLOSTAR 100 UNIT/ML injection pen Inject 40 Units into the skin nightly 15 Adjustable Dose Pre-filled Pen Syringe 2    JARDIANCE 10 MG tablet TAKE 1 TABLET BY MOUTH DAILY 90 tablet 1    levothyroxine (SYNTHROID) 100 MCG tablet Take 1 tablet daily 90 tablet 1    famotidine (PEPCID) 20 MG tablet Take 1 tablet by mouth 2 times daily 180 tablet 1    amLODIPine (NORVASC) 5 MG tablet TAKE 1 TABLET DAILY 90 tablet 1    polyethylene glycol (GLYCOLAX) 17 GM/SCOOP powder 1 powder in packet DAILY (route: oral)      insulin lispro, 1 Unit Dial, (HUMALOG KWIKPEN) 100 UNIT/ML SOPN Use on a sliding scale 3 times a day with meals. Maximum dose 30 units per day.  15 Adjustable Dose Pre-filled Pen Syringe 0    Continuous Blood Gluc Sensor (FREESTYLE ANT SENSOR SYSTEM) MISC 1 Units by Other route every 14 days Place 1 sensor on back of arm every 14 days 6 each 0    Handicap Placard MISC by Does not apply route 08/09/22 1 each 0    Continuous Blood Gluc Sensor ([facility]) MISC 1 box by Does not apply route 2 times daily 1 each 0    BD PEN NEEDLE MICRO U/F 32G X 6 MM MISC USE WITH LANTUS DAILY 100 each 5    METAMUCIL FIBER PO Take by mouth MiraLax instead      ondansetron (ZOFRAN) 4 MG tablet Take 1 tablet by mouth 3 times daily as needed for Nausea or Vomiting 30 tablet 0    Cyanocobalamin (B-12) 50 MCG TABS Take by mouth       Multiple Vitamins-Minerals (VITEYES COMPLETE PO) Take by mouth      latanoprost (XALATAN) 0.005 % ophthalmic solution 1 drop nightly       No current facility-administered medications for this visit. Social History:   Social History     Socioeconomic History    Marital status:      Spouse name: Not on file    Number of children: Not on file    Years of education: Not on file    Highest education level: Not on file   Occupational History    Not on file   Tobacco Use    Smoking status: Never    Smokeless tobacco: Never   Vaping Use    Vaping Use: Never used   Substance and Sexual Activity    Alcohol use: Never    Drug use: Never    Sexual activity: Not on file   Other Topics Concern    Not on file   Social History Narrative    Not on file     Social Determinants of Health     Financial Resource Strain: Low Risk     Difficulty of Paying Living Expenses: Not hard at all   Food Insecurity: No Food Insecurity    Worried About Running Out of Food in the Last Year: Never true    [address] in the Last Year: Never true   Transportation Needs: No Transportation Needs    Lack of Transportation (Medical): No    Lack of Transportation (Non-Medical):  No   Physical Activity: Insufficiently Active    Days of Exercise per Week: 2 days    Minutes of Exercise per Session: 20 min   Stress: Not on file   Social Connections: Not on file   Intimate Partner Violence: Not on file   Housing Stability: Low Risk     Unable to Pay for Housing in the Last Year: No    Number of Places Lived in the Last Year: 2    Unstable Housing in the Last Year: No     TOBACCO:   reports that she has never smoked. She has never used smokeless tobacco.  ETOH:   reports no history of alcohol use. Family History:   Family History   Problem Relation Age of Onset    Diabetes Paternal Grandmother     Diabetes Father     Lung Cancer Mother     Pancreatic Cancer Brother     Diabetes Brother      A:  Patient engaged and cooperative. Denies SI. Insight and motivation are good. Diagnosis:    1. Anxiety          Diagnosis Date    Colon polyps     Diabetes mellitus ([address]     Diabetic neuropathy ([address]     Diabetic retinopathy ([address]     Essential hypertension 10/28/2019    Fall     Fatty liver     Gastritis     GERD (gastroesophageal reflux disease)     Hyperlipidemia     Hypertension     Hypothyroidism     Irritable bowel syndrome     Major depressive disorder with single episode 10/28/2019    Osteopenia     Photosensitivity disorder     chronic    RBBB     Sleep apnea     on BIPAP    Thyroid disease     Thyroid nodule     neg bx-chronic thyroiditis    Type 2 diabetes mellitus with diabetic polyneuropathy, with long-term current use of insulin ([address] 07/23/2019    Vitamin D deficiency      Plan:  Pt interventions:  Conducted functional assessment, [place]-setting to identify pt's primary goals for [facility] visit / overall health, Supportive techniques, and Emphasized self-care as important for managing overall health.     Pt Behavioral Change Plan:   See Pt Instructions

## 2023-04-12 ENCOUNTER — APPOINTMENT (OUTPATIENT)
Dept: OBGYN | Facility: CLINIC | Age: 33
End: 2023-04-12
Payer: COMMERCIAL

## 2023-04-12 VITALS
SYSTOLIC BLOOD PRESSURE: 118 MMHG | BODY MASS INDEX: 38.87 KG/M2 | WEIGHT: 198 LBS | HEIGHT: 60 IN | DIASTOLIC BLOOD PRESSURE: 72 MMHG

## 2023-04-12 PROCEDURE — 99395 PREV VISIT EST AGE 18-39: CPT

## 2023-04-12 PROCEDURE — 99072 ADDL SUPL MATRL&STAF TM PHE: CPT

## 2023-04-12 NOTE — HISTORY OF PRESENT ILLNESS
[FreeTextEntry1] : 33yo P3 LMP here for annual exam\par no gyn complaints\par \par unsatisfactory pap last year\par \par

## 2023-04-14 LAB
BACTERIA UR CULT: NORMAL
HPV HIGH+LOW RISK DNA PNL CVX: NOT DETECTED

## 2023-05-05 DIAGNOSIS — Z30.09 ENCOUNTER FOR OTHER GENERAL COUNSELING AND ADVICE ON CONTRACEPTION: ICD-10-CM

## 2023-05-05 LAB — CYTOLOGY CVX/VAG DOC THIN PREP: NORMAL

## 2023-07-31 NOTE — ED ADULT NURSE NOTE - CAS DISCH ACCOMP BY
no blurred vision/no change in level of consciousness/no confusion/no fever/no loss of consciousness/no nausea/no numbness/no vomiting/no weakness
Spouse/EDT

## 2023-09-27 NOTE — PATIENT PROFILE ADULT - NSPROGENPREVTRANSF_GEN_A_NUR
Occupational Therapy    Visit Type: treatment  Co-treat with: Physical therapist (deconditioning, frequent refusals)  SUBJECTIVE  Patient agreed to participate in therapy this date.  \"I do so much better with my shoes, they're special to me!\"  \"I'm wearing a mask because I have no immune system.\"    Patient intermittently emotionally labile this date, needing max encouragement to participate with therapies despite walker and shoes from home not yet being delivered. Patient tangential, perseverative, and needing frequent redirection.     At end of session patient's items were delivered, writer ensured pt that they will be utilized in the next therapy session.     Patient / Family Goal: maximize function  Pain intermittently noted at R hip, however did no limit mobility this date.    OBJECTIVE     Cognitive Status   Functional Communication   - Overall Status: within functional limits   - Forms of Communication: verbal  Attention Span    - Attention: impaired   - Attention impairment: distractibility, agitation, tangential behaviors and perseveration  Following Direction   - follows one step commands with repetition  Safety Awareness/Insight   - impaired and impulsive    Patient Activity Tolerance: 1 to 1 activity to rest      Range of Motion (ROM)   (degrees unless noted; active unless noted; norms in ( ); negative=lacking to 0, positive=beyond 0)  WFL: RUE  Comments: LUE Shoulder Flexion: approx. half range      Sitting Balance  (JOSIAS = base of support)  Static      - Trial 1 details: stand by assist and with back unsupported  Dynamic      - Trial 1 details: contact guard and with back unsupported  Sitting edge of bed       Bed Mobility  - Supine to sit: moderate assist, 2 persons, with verbal cues, with tactile cues  - Sit to supine: minimal assist, 2 persons, with verbal cues, with tactile cues  Mod A bringing LE's off edge of bed and bringing trunk upright. Pt with limited LUE AROM which limited ability to reach  across midline to assist with transition from supine>sitting.     Min A to bring shoulders to center of bed, Max VC's for overall positioning.   Transfers  Assistive devices: gait belt, platform attachment, 2-wheeled walker, 2 person  - Sit to stand: maximal assist, 2 persons       - Second Trial: moderate assist, 2 persons  - Stand to sit: minimal assist, 2 persons  Pt refusing to use BUE's to push off bed/bedrails instead pulling from platform walker.   2 people necessary to assist with force production at gait belt and to hold platform walker firm to prevent tipping. Pt completed 2 STS with this method and refusing to attempt any further.       Functional Ambulation  - Assistance: minimal assist, moderate assist and 2 persons  - Assistive device: gait belt and platform attachment  At edge of bed patient completed ~5 forward and backwards steps, 5x marching BLE.  Max encouragement needed throughout, strong Mod x2 for steadying and safety, poor carryover with VC's for upright posture.  Activities of Daily Living (ADLs)  Lower Body Dressing:   - Assist: total assist - dependent   - Position: supine/bed  - Footwear:       - Type: socks  Interventions    Training provided: ADL training, balance retraining, bed mobility training, body mechanics, functional ambulation, transfer training and activity tolerance  Skilled input: verbal instruction/cues  Verbal Consent: Writer verbally educated and received verbal consent for hand placement, positioning of patient, and techniques to be performed today from patient for clothing adjustments for techniques and therapist position for techniques as described above and how they are pertinent to the patient's plan of care.         Education:   - Present and ready to learn: patient  Education provided during session:  - Results of above outlined education: Needs reinforcement and No evidence of learning    ASSESSMENT   Progress: slow progress  Interferring components: cognitive  deficits, decreased activity tolerance and decreased insight into deficit    Discharge needs based on today's assessment:  - Current level of function: slightly below baseline level of function  - Therapy needs at discharge: therapy 5 or more times per week  - Activities of daily living (ADLs) requiring support at discharge: bed mobility, transfers, ambulation, dressing, grooming, bathing and toileting  - Instrumental activities of daily living (IADLs) requiring support at discharge: community mobility  - Impairments that require further therapy intervention: pain, ROM, strength, activity tolerance, safety awareness, coordination and balance  AM-PAC  - Prior Level of Function: Needs a little help ([facility] 12-21)       Key: MOD A=moderate assistance, IND/MOD I=independent/modified independent  - Generalized Current Level of Function     - Current Self-Cares: 12       Scoring Key= >21 Modified Independent; 20-21 Supervision; 18-19 Minimal assist; 13-17 Moderate assist; 9-12 Max assist; <9 Total assist        PLAN (while hospitalized)  Suggestions for next session as indicated: Cotx with PT, progress bed mobility and transfers, seated ADL    OT Frequency: 3-5 x per week      PT/OT Mobility Equipment for Discharge: Pt owns a 2ww with bilateral UE platforms  PT/OT ADL Equipment for Discharge: will continue to assess, unable to deliver hospital bed as patient and  have no one to remove existing bed, therefore, hospital bed delivery individuals can't deliver, they can't remove previous bed  Agreement to plan and goals: patient agrees with goals and treatment plan      GOALS  Review Date: 10/3/2023  Long Term Goals: (to be met by time of discharge from hospital)  Grooming: Patient will complete grooming tasks modified independent.  Upper body dressing: Patient will complete upper body dressing modified independent.  Lower body dressing: Patient will complete lower body dressing modified independent.  Toileting: Patient  will complete toileting modified independent.  Bathing: Patient will complete bathingmodified independent Toilet transfer: Patient will complete toilet transfer with modified independent.   Home setting transfer: Patient will complete home setting transfers with modified independent.         Documented in the chart in the following areas: Assessment/Plan.    Patient at End of Session:   Location: in bed  Safety measures: alarm system in place/re-engaged  Handoff to: nurse assistant      Therapy procedure time and total treatment time can be found documented on the Time Entry flowsheet   no

## 2024-02-16 PROBLEM — Z30.09 BIRTH CONTROL COUNSELING: Status: ACTIVE | Noted: 2024-02-16

## 2024-02-16 RX ORDER — ETONOGESTREL AND ETHINYL ESTRADIOL .12; .015 MG/D; MG/D
0.12-0.015 INSERT, EXTENDED RELEASE VAGINAL
Qty: 3 | Refills: 0 | Status: ACTIVE | COMMUNITY
Start: 2024-02-16 | End: 1900-01-01

## 2024-02-16 RX ORDER — ETONOGESTREL AND ETHINYL ESTRADIOL 11.7; 2.7 MG/1; MG/1
0.12-0.015 INSERT, EXTENDED RELEASE VAGINAL
Qty: 3 | Refills: 0 | Status: COMPLETED | COMMUNITY
Start: 2021-10-20 | End: 2024-02-16

## 2024-04-24 ENCOUNTER — APPOINTMENT (OUTPATIENT)
Dept: OBGYN | Facility: CLINIC | Age: 34
End: 2024-04-24
Payer: COMMERCIAL

## 2024-04-24 VITALS
SYSTOLIC BLOOD PRESSURE: 140 MMHG | DIASTOLIC BLOOD PRESSURE: 86 MMHG | BODY MASS INDEX: 40.25 KG/M2 | WEIGHT: 205 LBS | HEIGHT: 60 IN

## 2024-04-24 VITALS — DIASTOLIC BLOOD PRESSURE: 76 MMHG | SYSTOLIC BLOOD PRESSURE: 108 MMHG

## 2024-04-24 DIAGNOSIS — R87.615 UNSATISFACTORY CYTOLOGIC SMEAR OF CERVIX: ICD-10-CM

## 2024-04-24 DIAGNOSIS — Z87.09 PERSONAL HISTORY OF OTHER DISEASES OF THE RESPIRATORY SYSTEM: ICD-10-CM

## 2024-04-24 DIAGNOSIS — Z01.419 ENCOUNTER FOR GYNECOLOGICAL EXAMINATION (GENERAL) (ROUTINE) W/OUT ABNORMAL FINDINGS: ICD-10-CM

## 2024-04-24 PROCEDURE — 99395 PREV VISIT EST AGE 18-39: CPT

## 2024-04-24 NOTE — HISTORY OF PRESENT ILLNESS
[FreeTextEntry1] : 34yo  P3 LMP on Nuvaring for BC and cycle ontrol  resolution of palpitations in 2022, Gerd  menses regular  w ring no gyn complaints

## 2024-04-24 NOTE — PHYSICAL EXAM
[Chaperone Declined] : Patient declined chaperone [Appropriately responsive] : appropriately responsive [Alert] : alert [No Acute Distress] : no acute distress [No Lymphadenopathy] : no lymphadenopathy [Soft] : soft [Non-tender] : non-tender [Non-distended] : non-distended [No HSM] : No HSM [No Lesions] : no lesions [No Mass] : no mass [Oriented x3] : oriented x3 [Examination Of The Breasts] : a normal appearance [No Masses] : no breast masses were palpable [Labia Majora] : normal [Labia Minora] : normal [Normal] : normal [Tenderness] : nontender [Uterine Adnexae] : normal

## 2024-04-24 NOTE — PLAN
[FreeTextEntry1] : doing well   breast imaging, noted pain (resolved)  no contraindicaiton BC repeat BP wnl

## 2024-04-28 LAB — HPV HIGH+LOW RISK DNA PNL CVX: NOT DETECTED

## 2024-05-01 LAB — CYTOLOGY CVX/VAG DOC THIN PREP: ABNORMAL

## 2024-06-08 RX ORDER — ETONOGESTREL AND ETHINYL ESTRADIOL .12; .015 MG/D; MG/D
0.12-0.015 RING VAGINAL
Qty: 3 | Refills: 2 | Status: ACTIVE | COMMUNITY
Start: 2024-06-07 | End: 1900-01-01

## 2024-12-06 ENCOUNTER — TRANSCRIPTION ENCOUNTER (OUTPATIENT)
Age: 34
End: 2024-12-06

## 2024-12-06 ENCOUNTER — NON-APPOINTMENT (OUTPATIENT)
Age: 34
End: 2024-12-06

## 2025-05-15 DIAGNOSIS — R39.9 UNSPECIFIED SYMPTOMS AND SIGNS INVOLVING THE GENITOURINARY SYSTEM: ICD-10-CM

## 2025-05-15 RX ORDER — NITROFURANTOIN (MONOHYDRATE/MACROCRYSTALS) 25; 75 MG/1; MG/1
100 CAPSULE ORAL TWICE DAILY
Qty: 14 | Refills: 0 | Status: ACTIVE | COMMUNITY
Start: 2025-05-15 | End: 1900-01-01